# Patient Record
Sex: MALE | Race: WHITE | Employment: FULL TIME | URBAN - METROPOLITAN AREA
[De-identification: names, ages, dates, MRNs, and addresses within clinical notes are randomized per-mention and may not be internally consistent; named-entity substitution may affect disease eponyms.]

---

## 2017-01-22 ENCOUNTER — NURSE ONLY (OUTPATIENT)
Dept: FAMILY MEDICINE CLINIC | Facility: CLINIC | Age: 52
End: 2017-01-22

## 2017-01-22 VITALS
WEIGHT: 270 LBS | OXYGEN SATURATION: 96 % | TEMPERATURE: 99 F | HEIGHT: 70 IN | DIASTOLIC BLOOD PRESSURE: 86 MMHG | SYSTOLIC BLOOD PRESSURE: 124 MMHG | BODY MASS INDEX: 38.65 KG/M2 | RESPIRATION RATE: 20 BRPM | HEART RATE: 82 BPM

## 2017-01-22 DIAGNOSIS — J01.00 ACUTE MAXILLARY SINUSITIS, RECURRENCE NOT SPECIFIED: Primary | ICD-10-CM

## 2017-01-22 DIAGNOSIS — J01.10 ACUTE FRONTAL SINUSITIS, RECURRENCE NOT SPECIFIED: ICD-10-CM

## 2017-01-22 PROCEDURE — 99203 OFFICE O/P NEW LOW 30 MIN: CPT | Performed by: NURSE PRACTITIONER

## 2017-01-22 RX ORDER — DOXYCYCLINE HYCLATE 100 MG
100 TABLET ORAL 2 TIMES DAILY
Qty: 20 TABLET | Refills: 0 | Status: SHIPPED | OUTPATIENT
Start: 2017-01-22 | End: 2017-01-30

## 2017-01-22 NOTE — PROGRESS NOTES
CHIEF COMPLAINT:   Patient presents with:  Sinus Problem: maxillary and frontal sinus pressure x 3 days    HPI:   Jameel Hernandez is a 46year old male with hx of cold symptoms that have progressed to frontal and maxillary sinus congestion and pressure. SKIN: no rashes,no suspicious lesions  HEAD: atraumatic, normocephalic. EYES: conjunctiva clear, EOM intact  EARS: TM's are dull, non-bulging and non-injected, bilaterally  NOSE: No exudates, nasal mucosa is erythematous and swollen.    THROAT: Oral mucos · Heat may help soothe painful areas of the face. Use a towel soaked in hot water. Or,  the shower and direct the hot spray onto your face.  Using a vaporizer along with a menthol rub at night may also help.   · An expectorant containing guaifenesin · Vision problems, including blurred or double vision  · Fever of 100.4ºF (38ºC) or higher, or as directed by your healthcare provider  · Seizure  · Breathing problems  · Symptoms not resolving within 10 days  Date Last Reviewed: 4/13/2015  © 2509-4593 [employer]

## 2017-04-20 PROBLEM — M94.261 CHONDROMALACIA, RIGHT KNEE: Status: ACTIVE | Noted: 2017-04-20

## 2018-07-24 ENCOUNTER — HOSPITAL ENCOUNTER (OUTPATIENT)
Age: 53
Discharge: HOME | End: 2018-07-24
Attending: EMERGENCY MEDICINE
Payer: OTHER MISCELLANEOUS

## 2018-07-24 ENCOUNTER — APPOINTMENT (OUTPATIENT)
Dept: GENERAL RADIOLOGY | Age: 53
End: 2018-07-24
Attending: EMERGENCY MEDICINE
Payer: OTHER MISCELLANEOUS

## 2018-07-24 VITALS
BODY MASS INDEX: 38.65 KG/M2 | OXYGEN SATURATION: 98 % | DIASTOLIC BLOOD PRESSURE: 78 MMHG | SYSTOLIC BLOOD PRESSURE: 125 MMHG | RESPIRATION RATE: 18 BRPM | TEMPERATURE: 98 F | HEIGHT: 70 IN | WEIGHT: 270 LBS | HEART RATE: 76 BPM

## 2018-07-24 DIAGNOSIS — S86.911A KNEE STRAIN, RIGHT, INITIAL ENCOUNTER: Primary | ICD-10-CM

## 2018-07-24 PROCEDURE — 99203 OFFICE O/P NEW LOW 30 MIN: CPT

## 2018-07-24 PROCEDURE — 73560 X-RAY EXAM OF KNEE 1 OR 2: CPT | Performed by: EMERGENCY MEDICINE

## 2018-07-24 RX ORDER — HYDROCODONE BITARTRATE AND ACETAMINOPHEN 5; 325 MG/1; MG/1
1-2 TABLET ORAL EVERY 6 HOURS PRN
Qty: 8 TABLET | Refills: 0 | Status: SHIPPED | OUTPATIENT
Start: 2018-07-24 | End: 2019-03-13 | Stop reason: ALTCHOICE

## 2018-07-24 NOTE — ED INITIAL ASSESSMENT (HPI)
Twisted right knee while pulling 6-7 pallets. Felt sore as the day went on. The next day felt much worse, took OTC meds without relief.  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 + distal.

## 2018-07-24 NOTE — ED PROVIDER NOTES
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  He appears well-developed a without relief. 8 tablets of Norco were given along with no driving precautions. Work restrictions were given. All questions were answered prior to discharge.         Disposition and Plan     Clinical Impression:  Knee strain, right, initial encounter  (

## 2018-07-26 ENCOUNTER — HOSPITAL ENCOUNTER (OUTPATIENT)
Dept: ULTRASOUND IMAGING | Facility: HOSPITAL | Age: 53
Discharge: HOME | End: 2018-07-26
Attending: FAMILY MEDICINE
Payer: OTHER MISCELLANEOUS

## 2018-07-26 ENCOUNTER — OFFICE VISIT (OUTPATIENT)
Dept: OTHER | Facility: HOSPITAL | Age: 53
End: 2018-07-26
Attending: FAMILY MEDICINE
Payer: OTHER MISCELLANEOUS

## 2018-07-26 DIAGNOSIS — R60.9 SWELLING: Primary | ICD-10-CM

## 2018-07-26 DIAGNOSIS — R60.9 SWELLING: ICD-10-CM

## 2018-07-26 PROCEDURE — 93971 EXTREMITY STUDY: CPT | Performed by: FAMILY MEDICINE

## 2018-08-03 ENCOUNTER — OFFICE VISIT (OUTPATIENT)
Dept: PHYSICAL THERAPY | Age: 53
End: 2018-08-03
Payer: OTHER MISCELLANEOUS

## 2018-08-07 ENCOUNTER — OFFICE VISIT (OUTPATIENT)
Dept: PHYSICAL THERAPY | Age: 53
End: 2018-08-07
Attending: FAMILY MEDICINE
Payer: OTHER MISCELLANEOUS

## 2018-08-07 PROCEDURE — 97161 PT EVAL LOW COMPLEX 20 MIN: CPT

## 2018-08-07 PROCEDURE — 97110 THERAPEUTIC EXERCISES: CPT

## 2018-08-07 NOTE — PROGRESS NOTES
LOWER EXTREMITY EVALUATION:   Referring Physician: Dr. Irene Hart  Diagnosis: Right Kb     Date of Service: 8/7/2018     PATIENT SUMMARY   Carmen Snellen is a 45year old y/o male who presents to therapy today with complaints of right knee 4/5; L 4/5 Flexion: R 4/5; L 4/5  Extension: R 4/5; L 5/5    DF: R 5/5; L 5/5  PF: R 4/5; L 5/5  INV: R 5/5; L 5/5  EV: R 5/5; L 5/5     Special tests: Negative    Balance: SLS: R 30 sec, L 30 sec    Today’s Treatment and Response: Supine quad set R 10 x 5

## 2018-08-10 ENCOUNTER — OFFICE VISIT (OUTPATIENT)
Dept: PHYSICAL THERAPY | Age: 53
End: 2018-08-10
Attending: FAMILY MEDICINE
Payer: OTHER MISCELLANEOUS

## 2018-08-10 PROCEDURE — 97110 THERAPEUTIC EXERCISES: CPT

## 2018-08-10 NOTE — PROGRESS NOTES
Dx: R knee strain         Authorized # of Visits:  8         Next MD visit: none scheduled  Fall Risk: standard         Precautions: Prostate Cancer, HTN             Subjective: Pain, soreness in the right knee 2/10 today.  Working today from 12-8 pm.     Nico Castorena

## 2018-08-15 ENCOUNTER — OFFICE VISIT (OUTPATIENT)
Dept: PHYSICAL THERAPY | Age: 53
End: 2018-08-15
Attending: FAMILY MEDICINE
Payer: OTHER MISCELLANEOUS

## 2018-08-15 PROCEDURE — 97035 APP MDLTY 1+ULTRASOUND EA 15: CPT

## 2018-08-15 PROCEDURE — 97110 THERAPEUTIC EXERCISES: CPT

## 2018-08-15 NOTE — PROGRESS NOTES
Dx: R knee strain         Authorized # of Visits:  8         Next MD visit: none scheduled  Fall Risk: standard         Precautions: Prostate Cancer, HTN             Subjective: Pain, soreness in the right knee 3/10 today.  Achy this morning, worse after mo knee flexion with 3# wt R x 20 Patella mobilizations R 30 secs each direction        Prone quads stretch R 30 sec hold x 3 Prone hamstring curls with 3# wt R x 20  Prone quads stretch R 30 sec hold        Supine hamstring stretch R 30 sec hold x 3 Seated k

## 2018-08-17 ENCOUNTER — OFFICE VISIT (OUTPATIENT)
Dept: PHYSICAL THERAPY | Age: 53
End: 2018-08-17
Attending: FAMILY MEDICINE
Payer: OTHER MISCELLANEOUS

## 2018-08-17 PROCEDURE — 97140 MANUAL THERAPY 1/> REGIONS: CPT

## 2018-08-17 PROCEDURE — 97110 THERAPEUTIC EXERCISES: CPT

## 2018-08-17 NOTE — PROGRESS NOTES
Dx: R knee strain         Authorized # of Visits:  8         Next MD visit: none scheduled  Fall Risk: standard         Precautions: Prostate Cancer, HTN             Subjective: Pain, soreness in the right knee 3-4/10 today.  I am concerned that it is not g stretch R 30 sec hold  STM with biofreeze gel medial aspect R knee 5 mins       Hook lying bridging x 20 Hook lying bridging x 20 Supine SLR R x 20       Side lying clam R x 20  Hip abd R x 20 Side lying clam R x 20  Hip abd R x 20 X 20    X 20       Prone

## 2018-08-20 ENCOUNTER — APPOINTMENT (OUTPATIENT)
Dept: PHYSICAL THERAPY | Age: 53
End: 2018-08-20
Attending: FAMILY MEDICINE
Payer: OTHER MISCELLANEOUS

## 2018-08-20 ENCOUNTER — APPOINTMENT (OUTPATIENT)
Dept: OTHER | Facility: HOSPITAL | Age: 53
End: 2018-08-20
Attending: ORTHOPAEDIC SURGERY

## 2018-08-22 ENCOUNTER — APPOINTMENT (OUTPATIENT)
Dept: PHYSICAL THERAPY | Age: 53
End: 2018-08-22
Attending: FAMILY MEDICINE
Payer: OTHER MISCELLANEOUS

## 2018-08-24 ENCOUNTER — OFFICE VISIT (OUTPATIENT)
Dept: PHYSICAL THERAPY | Age: 53
End: 2018-08-24
Attending: FAMILY MEDICINE
Payer: OTHER MISCELLANEOUS

## 2018-08-24 PROCEDURE — 97110 THERAPEUTIC EXERCISES: CPT

## 2018-08-24 PROCEDURE — 97140 MANUAL THERAPY 1/> REGIONS: CPT

## 2018-08-24 NOTE — PROGRESS NOTES
Dx: R knee strain         Authorized # of Visits:  8         Next MD visit: none scheduled  Fall Risk: standard         Precautions: Prostate Cancer, HTN             Subjective: Pain, soreness in the right knee 1-2/10 today.  I been taking it easy at work t aspect R knee 5 mins Supine quad set over towel roll R 3 x 10  Heel slides R x 10    Hook lying bridging x 20 Hook lying bridging x 20 Supine SLR R x 20 Hook lying bridging 3 x 10    Side lying clam R x 20  Hip abd R x 20 Side lying clam R x 20  Hip abd R

## 2018-08-27 ENCOUNTER — OFFICE VISIT (OUTPATIENT)
Dept: PHYSICAL THERAPY | Age: 53
End: 2018-08-27
Attending: FAMILY MEDICINE
Payer: OTHER MISCELLANEOUS

## 2018-08-27 PROCEDURE — 97140 MANUAL THERAPY 1/> REGIONS: CPT

## 2018-08-27 PROCEDURE — 97110 THERAPEUTIC EXERCISES: CPT

## 2018-08-27 NOTE — PROGRESS NOTES
Dx: R knee strain         Authorized # of Visits:  6        Next MD visit: 8/28/18  Fall Risk: standard         Precautions: Prostate Cancer, HTN           Progress note:  Subjective: Pain, soreness in the right knee ranges from 1-6/10.  Today is a good day band R 3 x 10 Seated knee flexion with blue t band R 3 x 10   Hook lying hip abd with black t band x 20  Add squeeze with ball x 20 Supine SLR R x 20  Heel slides R x 10  Hamstring stretch R 30 sec hold  STM with biofreeze gel medial aspect R knee 5 mins S distraction 30 secs, post, ant glide 30 secs Hook lying R knee grade 3 joint mobilizations ant glide, post glide, distraction 30 secs each   Supine with CP R knee 10 mins  HEP review 5 mins Supine with towel roll, CP R knee 10 mins Supine patella mobilizat

## 2018-09-10 ENCOUNTER — APPOINTMENT (OUTPATIENT)
Dept: PHYSICAL THERAPY | Age: 53
End: 2018-09-10
Attending: FAMILY MEDICINE
Payer: OTHER MISCELLANEOUS

## 2019-03-27 PROCEDURE — 88305 TISSUE EXAM BY PATHOLOGIST: CPT | Performed by: INTERNAL MEDICINE

## 2021-03-30 PROCEDURE — 88304 TISSUE EXAM BY PATHOLOGIST: CPT | Performed by: PLASTIC SURGERY

## 2021-04-12 DIAGNOSIS — Z23 NEED FOR VACCINATION: ICD-10-CM

## 2023-01-01 ENCOUNTER — APPOINTMENT (OUTPATIENT)
Dept: CT IMAGING | Age: 58
DRG: 955 | End: 2023-01-01
Attending: NURSE PRACTITIONER

## 2023-01-01 ENCOUNTER — APPOINTMENT (OUTPATIENT)
Dept: GENERAL RADIOLOGY | Age: 58
DRG: 955 | End: 2023-01-01

## 2023-01-01 ENCOUNTER — APPOINTMENT (OUTPATIENT)
Dept: CT IMAGING | Age: 58
DRG: 955 | End: 2023-01-01
Attending: PHYSICIAN ASSISTANT

## 2023-01-01 ENCOUNTER — APPOINTMENT (OUTPATIENT)
Dept: GENERAL RADIOLOGY | Age: 58
DRG: 955 | End: 2023-01-01
Attending: NURSE PRACTITIONER

## 2023-01-01 ENCOUNTER — APPOINTMENT (OUTPATIENT)
Dept: GENERAL RADIOLOGY | Age: 58
DRG: 955 | End: 2023-01-01
Attending: SURGERY

## 2023-01-01 ENCOUNTER — APPOINTMENT (OUTPATIENT)
Dept: CT IMAGING | Age: 58
DRG: 955 | End: 2023-01-01

## 2023-01-01 ENCOUNTER — ANESTHESIA (OUTPATIENT)
Dept: SURGERY | Age: 58
DRG: 955 | End: 2023-01-01

## 2023-01-01 ENCOUNTER — HOSPITAL ENCOUNTER (INPATIENT)
Age: 58
LOS: 8 days | DRG: 955 | End: 2023-08-18

## 2023-01-01 ENCOUNTER — APPOINTMENT (OUTPATIENT)
Dept: MRI IMAGING | Age: 58
DRG: 955 | End: 2023-01-01

## 2023-01-01 ENCOUNTER — ANESTHESIA EVENT (OUTPATIENT)
Dept: SURGERY | Age: 58
DRG: 955 | End: 2023-01-01

## 2023-01-01 VITALS
RESPIRATION RATE: 16 BRPM | BODY MASS INDEX: 29.41 KG/M2 | OXYGEN SATURATION: 36 % | DIASTOLIC BLOOD PRESSURE: 51 MMHG | SYSTOLIC BLOOD PRESSURE: 81 MMHG | HEIGHT: 71 IN | WEIGHT: 210.1 LBS | TEMPERATURE: 99.1 F
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
## 2023-01-01 LAB
ABO + RH BLD: NORMAL
ABO + RH BLD: NORMAL
ALBUMIN SERPL-MCNC: 1.9 G/DL (ref 3.6–5.1)
ALBUMIN SERPL-MCNC: 2 G/DL (ref 3.6–5.1)
ALBUMIN SERPL-MCNC: 2.2 G/DL (ref 3.6–5.1)
ALBUMIN SERPL-MCNC: 3.6 G/DL (ref 3.6–5.1)
ALBUMIN/GLOB SERPL: 0.4 {RATIO} (ref 1–2.4)
ALBUMIN/GLOB SERPL: 0.5 {RATIO} (ref 1–2.4)
ALBUMIN/GLOB SERPL: 0.5 {RATIO} (ref 1–2.4)
ALBUMIN/GLOB SERPL: 1.1 {RATIO} (ref 1–2.4)
ALP SERPL-CCNC: 72 UNITS/L (ref 45–117)
ALP SERPL-CCNC: 79 UNITS/L (ref 45–117)
ALP SERPL-CCNC: 86 UNITS/L (ref 45–117)
ALP SERPL-CCNC: 88 UNITS/L (ref 45–117)
ALP SERPL-CCNC: 89 UNITS/L (ref 45–117)
ALP SERPL-CCNC: 89 UNITS/L (ref 45–117)
ALP SERPL-CCNC: 98 UNITS/L (ref 45–117)
ALT SERPL-CCNC: 100 UNITS/L
ALT SERPL-CCNC: 127 UNITS/L
ALT SERPL-CCNC: 150 UNITS/L
ALT SERPL-CCNC: 152 UNITS/L
ALT SERPL-CCNC: 158 UNITS/L
ALT SERPL-CCNC: 167 UNITS/L
ALT SERPL-CCNC: 89 UNITS/L
AMPHETAMINES UR QL SCN>500 NG/ML: NEGATIVE
AMYLASE SERPL-CCNC: 105 UNITS/L (ref 25–115)
AMYLASE SERPL-CCNC: 111 UNITS/L (ref 25–115)
AMYLASE SERPL-CCNC: 125 UNITS/L (ref 25–115)
AMYLASE SERPL-CCNC: 129 UNITS/L (ref 25–115)
AMYLASE SERPL-CCNC: 132 UNITS/L (ref 25–115)
AMYLASE SERPL-CCNC: 37 UNITS/L (ref 25–115)
AMYLASE SERPL-CCNC: 92 UNITS/L (ref 25–115)
ANION GAP SERPL CALC-SCNC: 10 MMOL/L (ref 7–19)
ANION GAP SERPL CALC-SCNC: 10 MMOL/L (ref 7–19)
ANION GAP SERPL CALC-SCNC: 11 MMOL/L (ref 7–19)
ANION GAP SERPL CALC-SCNC: 13 MMOL/L (ref 7–19)
ANION GAP SERPL CALC-SCNC: 15 MMOL/L (ref 7–19)
ANION GAP SERPL CALC-SCNC: 5 MMOL/L (ref 7–19)
ANION GAP SERPL CALC-SCNC: 7 MMOL/L (ref 7–19)
ANION GAP SERPL CALC-SCNC: 8 MMOL/L (ref 7–19)
ANION GAP SERPL CALC-SCNC: 9 MMOL/L (ref 7–19)
APPEARANCE UR: CLEAR
APTT PPP: 25 SEC (ref 22–30)
APTT PPP: 25 SEC (ref 22–30)
APTT PPP: 26 SEC (ref 22–30)
APTT PPP: 26 SEC (ref 22–30)
APTT PPP: 27 SEC (ref 22–30)
APTT PPP: 28 SEC (ref 22–30)
APTT PPP: 28 SEC (ref 22–30)
APTT PPP: 29 SEC (ref 22–30)
AST SERPL-CCNC: 102 UNITS/L
AST SERPL-CCNC: 45 UNITS/L
AST SERPL-CCNC: 72 UNITS/L
AST SERPL-CCNC: 80 UNITS/L
AST SERPL-CCNC: 87 UNITS/L
AST SERPL-CCNC: 93 UNITS/L
AST SERPL-CCNC: 95 UNITS/L
ATRIAL RATE (BPM): 125
BACTERIA #/AREA URNS HPF: ABNORMAL /HPF
BARBITURATES UR QL SCN>200 NG/ML: NEGATIVE
BASE EXCESS / DEFICIT, ARTERIAL - RESPIRATORY: -6 MMOL/L (ref -2–3)
BASE EXCESS / DEFICIT, ARTERIAL - RESPIRATORY: -6 MMOL/L (ref -2–3)
BASE EXCESS / DEFICIT, ARTERIAL - RESPIRATORY: -7 MMOL/L (ref -2–3)
BASE EXCESS / DEFICIT, ARTERIAL - RESPIRATORY: 2 MMOL/L (ref -2–3)
BASE EXCESS / DEFICIT, ARTERIAL - RESPIRATORY: 4 MMOL/L (ref -2–3)
BASE EXCESS / DEFICIT, ARTERIAL - RESPIRATORY: 5 MMOL/L (ref -2–3)
BASE EXCESS / DEFICIT, ARTERIAL - RESPIRATORY: 5 MMOL/L (ref -2–3)
BASE EXCESS / DEFICIT, ARTERIAL - RESPIRATORY: 6 MMOL/L (ref -2–3)
BASE EXCESS / DEFICIT, ARTERIAL - RESPIRATORY: 7 MMOL/L (ref -2–3)
BASE EXCESS / DEFICIT, ARTERIAL - RESPIRATORY: 8 MMOL/L (ref -2–3)
BASE EXCESS / DEFICIT, ARTERIAL - RESPIRATORY: 8 MMOL/L (ref -2–3)
BASE EXCESS / DEFICIT, ARTERIAL - RESPIRATORY: 9 MMOL/L (ref -2–3)
BASOPHILS # BLD: 0 K/MCL (ref 0–0.3)
BASOPHILS # BLD: 0.1 K/MCL (ref 0–0.3)
BASOPHILS NFR BLD: 0 %
BASOPHILS NFR BLD: 0 %
BASOPHILS NFR BLD: 1 %
BDY SITE: ABNORMAL
BENZODIAZ UR QL SCN>200 NG/ML: NEGATIVE
BILIRUB CONJ SERPL-MCNC: 0.2 MG/DL (ref 0–0.2)
BILIRUB CONJ SERPL-MCNC: 0.3 MG/DL (ref 0–0.2)
BILIRUB SERPL-MCNC: 0.4 MG/DL (ref 0.2–1)
BILIRUB SERPL-MCNC: 0.5 MG/DL (ref 0.2–1)
BILIRUB SERPL-MCNC: 0.6 MG/DL (ref 0.2–1)
BILIRUB SERPL-MCNC: 0.7 MG/DL (ref 0.2–1)
BILIRUB UR QL STRIP: NEGATIVE
BLD GP AB SCN SERPL QL GEL: NEGATIVE
BLD GP AB SCN SERPL QL GEL: NEGATIVE
BLOOD EXPIRATION DATE: NORMAL
BODY TEMPERATURE: 36.1 DEGREES
BODY TEMPERATURE: 36.5 DEGREES
BODY TEMPERATURE: 36.6 DEGREES
BODY TEMPERATURE: 36.9 DEGREES
BODY TEMPERATURE: 37 DEGREES
BODY TEMPERATURE: 37.1 DEGREES
BODY TEMPERATURE: 37.3 DEGREES
BODY TEMPERATURE: 37.4 DEGREES
BODY TEMPERATURE: 37.4 DEGREES
BODY TEMPERATURE: 37.6 DEGREES
BODY TEMPERATURE: 37.7 DEGREES
BODY TEMPERATURE: 37.8 DEGREES
BODY TEMPERATURE: 37.9 DEGREES
BODY TEMPERATURE: 38 DEGREES
BODY TEMPERATURE: 38.5 DEGREES
BUN SERPL-MCNC: 12 MG/DL (ref 6–20)
BUN SERPL-MCNC: 14 MG/DL (ref 6–20)
BUN SERPL-MCNC: 15 MG/DL (ref 6–20)
BUN SERPL-MCNC: 16 MG/DL (ref 6–20)
BUN SERPL-MCNC: 18 MG/DL (ref 6–20)
BUN SERPL-MCNC: 18 MG/DL (ref 6–20)
BUN SERPL-MCNC: 20 MG/DL (ref 6–20)
BUN SERPL-MCNC: 23 MG/DL (ref 6–20)
BUN SERPL-MCNC: 29 MG/DL (ref 6–20)
BUN SERPL-MCNC: 30 MG/DL (ref 6–20)
BUN SERPL-MCNC: 36 MG/DL (ref 6–20)
BUN SERPL-MCNC: 37 MG/DL (ref 6–20)
BUN SERPL-MCNC: 37 MG/DL (ref 6–20)
BUN SERPL-MCNC: 42 MG/DL (ref 6–20)
BUN/CREAT SERPL: 21 (ref 7–25)
BUN/CREAT SERPL: 21 (ref 7–25)
BUN/CREAT SERPL: 22 (ref 7–25)
BUN/CREAT SERPL: 30 (ref 7–25)
BUN/CREAT SERPL: 32 (ref 7–25)
BUN/CREAT SERPL: 35 (ref 7–25)
BUN/CREAT SERPL: 43 (ref 7–25)
BUN/CREAT SERPL: 52 (ref 7–25)
BUN/CREAT SERPL: 54 (ref 7–25)
BUN/CREAT SERPL: 61 (ref 7–25)
BUN/CREAT SERPL: 68 (ref 7–25)
BUN/CREAT SERPL: 70 (ref 7–25)
BUN/CREAT SERPL: 71 (ref 7–25)
BUN/CREAT SERPL: 77 (ref 7–25)
BZE UR QL SCN>150 NG/ML: NEGATIVE
CA-I BLD-SCNC: 1.13 MMOL/L (ref 1.15–1.29)
CALCIUM SERPL-MCNC: 7.4 MG/DL (ref 8.4–10.2)
CALCIUM SERPL-MCNC: 7.5 MG/DL (ref 8.4–10.2)
CALCIUM SERPL-MCNC: 7.9 MG/DL (ref 8.4–10.2)
CALCIUM SERPL-MCNC: 8.3 MG/DL (ref 8.4–10.2)
CALCIUM SERPL-MCNC: 8.3 MG/DL (ref 8.4–10.2)
CALCIUM SERPL-MCNC: 8.4 MG/DL (ref 8.4–10.2)
CALCIUM SERPL-MCNC: 8.4 MG/DL (ref 8.4–10.2)
CALCIUM SERPL-MCNC: 8.5 MG/DL (ref 8.4–10.2)
CALCIUM SERPL-MCNC: 8.6 MG/DL (ref 8.4–10.2)
CALCIUM SERPL-MCNC: 8.8 MG/DL (ref 8.4–10.2)
CANNABINOIDS UR QL SCN>50 NG/ML: NEGATIVE
CHLORIDE BLD-SCNC: 104 MMOL/L (ref 97–110)
CHLORIDE SERPL-SCNC: 108 MMOL/L (ref 97–110)
CHLORIDE SERPL-SCNC: 109 MMOL/L (ref 97–110)
CHLORIDE SERPL-SCNC: 110 MMOL/L (ref 97–110)
CHLORIDE SERPL-SCNC: 111 MMOL/L (ref 97–110)
CHLORIDE SERPL-SCNC: 111 MMOL/L (ref 97–110)
CHLORIDE SERPL-SCNC: 113 MMOL/L (ref 97–110)
CHLORIDE SERPL-SCNC: 113 MMOL/L (ref 97–110)
CHLORIDE SERPL-SCNC: 116 MMOL/L (ref 97–110)
CHLORIDE SERPL-SCNC: 117 MMOL/L (ref 97–110)
CHLORIDE SERPL-SCNC: 118 MMOL/L (ref 97–110)
CK SERPL-CCNC: 103 UNITS/L (ref 39–308)
CK SERPL-CCNC: 125 UNITS/L (ref 39–308)
CK SERPL-CCNC: 194 UNITS/L (ref 39–308)
CK SERPL-CCNC: 214 UNITS/L (ref 39–308)
CK SERPL-CCNC: 228 UNITS/L (ref 39–308)
CK SERPL-CCNC: 37 UNITS/L (ref 39–308)
CK SERPL-CCNC: 47 UNITS/L (ref 39–308)
CLOSURE TME BLD-IMP: NORMAL
CLOSURE TME COLL+ADP BLD: NORMAL S
CLOSURE TME COLL+EPINEP BLD: 122 SEC (ref 70–160)
CO2 SERPL-SCNC: 20 MMOL/L (ref 21–32)
CO2 SERPL-SCNC: 21 MMOL/L (ref 21–32)
CO2 SERPL-SCNC: 22 MMOL/L (ref 21–32)
CO2 SERPL-SCNC: 26 MMOL/L (ref 21–32)
CO2 SERPL-SCNC: 26 MMOL/L (ref 21–32)
CO2 SERPL-SCNC: 27 MMOL/L (ref 21–32)
CO2 SERPL-SCNC: 28 MMOL/L (ref 21–32)
CO2 SERPL-SCNC: 30 MMOL/L (ref 21–32)
CO2 SERPL-SCNC: 30 MMOL/L (ref 21–32)
CO2 SERPL-SCNC: 31 MMOL/L (ref 21–32)
CO2 SERPL-SCNC: 31 MMOL/L (ref 21–32)
COHGB MFR BLDV: 1.8 %
COHGB MFR BLDV: 2.2 %
COLOR UR: ABNORMAL
COLOR UR: COLORLESS
COLOR UR: YELLOW
CONDITION: ABNORMAL
CREAT SERPL-MCNC: 0.44 MG/DL (ref 0.67–1.17)
CREAT SERPL-MCNC: 0.47 MG/DL (ref 0.67–1.17)
CREAT SERPL-MCNC: 0.48 MG/DL (ref 0.67–1.17)
CREAT SERPL-MCNC: 0.49 MG/DL (ref 0.67–1.17)
CREAT SERPL-MCNC: 0.51 MG/DL (ref 0.67–1.17)
CREAT SERPL-MCNC: 0.53 MG/DL (ref 0.67–1.17)
CREAT SERPL-MCNC: 0.54 MG/DL (ref 0.67–1.17)
CREAT SERPL-MCNC: 0.56 MG/DL (ref 0.67–1.17)
CREAT SERPL-MCNC: 0.57 MG/DL (ref 0.67–1.17)
CREAT SERPL-MCNC: 0.59 MG/DL (ref 0.67–1.17)
CREAT SERPL-MCNC: 0.67 MG/DL (ref 0.67–1.17)
CREAT SERPL-MCNC: 0.67 MG/DL (ref 0.67–1.17)
CROSSMATCH RESULT: NORMAL
D DIMER PPP FEU-MCNC: 10.1 MG/L (FEU)
D DIMER PPP FEU-MCNC: 11.12 MG/L (FEU)
D DIMER PPP FEU-MCNC: 13.63 MG/L (FEU)
D DIMER PPP FEU-MCNC: 15.82 MG/L (FEU)
D DIMER PPP FEU-MCNC: 23.1 MG/L (FEU)
D DIMER PPP FEU-MCNC: 9.56 MG/L (FEU)
DEPRECATED RDW RBC: 42.8 FL (ref 39–50)
DEPRECATED RDW RBC: 43.3 FL (ref 39–50)
DEPRECATED RDW RBC: 44 FL (ref 39–50)
DEPRECATED RDW RBC: 44.4 FL (ref 39–50)
DEPRECATED RDW RBC: 45.6 FL (ref 39–50)
DEPRECATED RDW RBC: 45.7 FL (ref 39–50)
DEPRECATED RDW RBC: 45.7 FL (ref 39–50)
DEPRECATED RDW RBC: 46.1 FL (ref 39–50)
DEPRECATED RDW RBC: 46.2 FL (ref 39–50)
DEPRECATED RDW RBC: 46.4 FL (ref 39–50)
DEPRECATED RDW RBC: 46.5 FL (ref 39–50)
DEPRECATED RDW RBC: 46.6 FL (ref 39–50)
DEPRECATED RDW RBC: 46.8 FL (ref 39–50)
DEPRECATED RDW RBC: 47.8 FL (ref 39–50)
DISPENSE STATUS: NORMAL
EGFRCR SERPLBLD CKD-EPI 2021: >90 ML/MIN/{1.73_M2}
EOSINOPHIL # BLD: 0 K/MCL (ref 0–0.5)
EOSINOPHIL # BLD: 0.1 K/MCL (ref 0–0.5)
EOSINOPHIL # BLD: 0.2 K/MCL (ref 0–0.5)
EOSINOPHIL # BLD: 0.3 K/MCL (ref 0–0.5)
EOSINOPHIL NFR BLD: 0 %
EOSINOPHIL NFR BLD: 1 %
EOSINOPHIL NFR BLD: 2 %
EOSINOPHIL NFR BLD: 3 %
EOSINOPHIL NFR BLD: 3 %
EOSINOPHIL NFR BLD: 4 %
ERYTHROCYTE [DISTWIDTH] IN BLOOD: 12.2 % (ref 11–15)
ERYTHROCYTE [DISTWIDTH] IN BLOOD: 12.4 % (ref 11–15)
ERYTHROCYTE [DISTWIDTH] IN BLOOD: 12.5 % (ref 11–15)
ERYTHROCYTE [DISTWIDTH] IN BLOOD: 12.5 % (ref 11–15)
ERYTHROCYTE [DISTWIDTH] IN BLOOD: 12.6 % (ref 11–15)
ERYTHROCYTE [DISTWIDTH] IN BLOOD: 12.6 % (ref 11–15)
ERYTHROCYTE [DISTWIDTH] IN BLOOD: 12.8 % (ref 11–15)
ERYTHROCYTE [DISTWIDTH] IN BLOOD: 12.8 % (ref 11–15)
ERYTHROCYTE [DISTWIDTH] IN BLOOD: 12.9 % (ref 11–15)
ERYTHROCYTE [DISTWIDTH] IN BLOOD: 13 % (ref 11–15)
ERYTHROCYTE [DISTWIDTH] IN BLOOD: 13 % (ref 11–15)
ERYTHROCYTE [DISTWIDTH] IN BLOOD: 13.1 % (ref 11–15)
ETHANOL SERPL-MCNC: 136 MG/DL
FASTING DURATION TIME PATIENT: ABNORMAL H
FIBRINOGEN PPP-MCNC: >850 MG/DL (ref 190–425)
FIO2 ON VENT: 100 %
FIO2 ON VENT: 40 %
FIO2 ON VENT: 50 %
GFR SERPLBLD BASED ON 1.73 SQ M-ARVRAT: >90 ML/MIN
GLOBULIN SER-MCNC: 3.3 G/DL (ref 2–4)
GLOBULIN SER-MCNC: 4.2 G/DL (ref 2–4)
GLOBULIN SER-MCNC: 4.3 G/DL (ref 2–4)
GLOBULIN SER-MCNC: 4.5 G/DL (ref 2–4)
GLOBULIN SER-MCNC: 4.5 G/DL (ref 2–4)
GLOBULIN SER-MCNC: 4.6 G/DL (ref 2–4)
GLOBULIN SER-MCNC: 4.7 G/DL (ref 2–4)
GLUCOSE BLD-MCNC: 130 MG/DL (ref 65–99)
GLUCOSE BLDC GLUCOMTR-MCNC: 118 MG/DL (ref 70–99)
GLUCOSE BLDC GLUCOMTR-MCNC: 126 MG/DL (ref 70–99)
GLUCOSE BLDC GLUCOMTR-MCNC: 134 MG/DL (ref 70–99)
GLUCOSE BLDC GLUCOMTR-MCNC: 143 MG/DL (ref 70–99)
GLUCOSE BLDC GLUCOMTR-MCNC: 145 MG/DL (ref 70–99)
GLUCOSE BLDC GLUCOMTR-MCNC: 147 MG/DL (ref 70–99)
GLUCOSE BLDC GLUCOMTR-MCNC: 152 MG/DL (ref 70–99)
GLUCOSE BLDC GLUCOMTR-MCNC: 155 MG/DL (ref 70–99)
GLUCOSE BLDC GLUCOMTR-MCNC: 157 MG/DL (ref 70–99)
GLUCOSE BLDC GLUCOMTR-MCNC: 162 MG/DL (ref 70–99)
GLUCOSE BLDC GLUCOMTR-MCNC: 164 MG/DL (ref 70–99)
GLUCOSE BLDC GLUCOMTR-MCNC: 165 MG/DL (ref 70–99)
GLUCOSE BLDC GLUCOMTR-MCNC: 167 MG/DL (ref 70–99)
GLUCOSE BLDC GLUCOMTR-MCNC: 172 MG/DL (ref 70–99)
GLUCOSE BLDC GLUCOMTR-MCNC: 173 MG/DL (ref 70–99)
GLUCOSE BLDC GLUCOMTR-MCNC: 174 MG/DL (ref 70–99)
GLUCOSE BLDC GLUCOMTR-MCNC: 177 MG/DL (ref 70–99)
GLUCOSE BLDC GLUCOMTR-MCNC: 190 MG/DL (ref 70–99)
GLUCOSE SERPL-MCNC: 114 MG/DL (ref 70–99)
GLUCOSE SERPL-MCNC: 120 MG/DL (ref 70–99)
GLUCOSE SERPL-MCNC: 126 MG/DL (ref 70–99)
GLUCOSE SERPL-MCNC: 150 MG/DL (ref 70–99)
GLUCOSE SERPL-MCNC: 153 MG/DL (ref 70–99)
GLUCOSE SERPL-MCNC: 153 MG/DL (ref 70–99)
GLUCOSE SERPL-MCNC: 165 MG/DL (ref 70–99)
GLUCOSE SERPL-MCNC: 167 MG/DL (ref 70–99)
GLUCOSE SERPL-MCNC: 168 MG/DL (ref 70–99)
GLUCOSE SERPL-MCNC: 178 MG/DL (ref 70–99)
GLUCOSE SERPL-MCNC: 186 MG/DL (ref 70–99)
GLUCOSE SERPL-MCNC: 190 MG/DL (ref 70–99)
GLUCOSE SERPL-MCNC: 193 MG/DL (ref 70–99)
GLUCOSE SERPL-MCNC: 195 MG/DL (ref 70–99)
GLUCOSE UR STRIP-MCNC: NEGATIVE MG/DL
HCO3 BLDA-SCNC: 19 MMOL/L (ref 22–28)
HCO3 BLDA-SCNC: 21 MMOL/L (ref 22–28)
HCO3 BLDA-SCNC: 21 MMOL/L (ref 22–28)
HCO3 BLDA-SCNC: 25 MMOL/L (ref 22–28)
HCO3 BLDA-SCNC: 27 MMOL/L (ref 22–28)
HCO3 BLDA-SCNC: 27 MMOL/L (ref 22–28)
HCO3 BLDA-SCNC: 28 MMOL/L (ref 22–28)
HCO3 BLDA-SCNC: 29 MMOL/L (ref 22–28)
HCO3 BLDA-SCNC: 31 MMOL/L (ref 22–28)
HCO3 BLDA-SCNC: 32 MMOL/L (ref 22–28)
HCO3 BLDA-SCNC: 33 MMOL/L (ref 22–28)
HCO3 BLDA-SCNC: 34 MMOL/L (ref 22–28)
HCT VFR BLD CALC: 22.1 % (ref 39–51)
HCT VFR BLD CALC: 24.7 % (ref 39–51)
HCT VFR BLD CALC: 25.3 % (ref 39–51)
HCT VFR BLD CALC: 26.8 % (ref 39–51)
HCT VFR BLD CALC: 27.9 % (ref 39–51)
HCT VFR BLD CALC: 28.1 % (ref 39–51)
HCT VFR BLD CALC: 28.2 % (ref 39–51)
HCT VFR BLD CALC: 28.2 % (ref 39–51)
HCT VFR BLD CALC: 28.7 % (ref 39–51)
HCT VFR BLD CALC: 28.9 % (ref 39–51)
HCT VFR BLD CALC: 30.8 % (ref 39–51)
HCT VFR BLD CALC: 32.3 % (ref 39–51)
HCT VFR BLD CALC: 35.9 % (ref 39–51)
HCT VFR BLD CALC: 38 % (ref 39–51)
HCT VFR BLD CALC: 41.3 % (ref 39–51)
HCT VFR BLD CALC: 43 % (ref 39–51)
HGB BLD-MCNC: 10.5 G/DL (ref 13–17)
HGB BLD-MCNC: 11.8 G/DL (ref 13–17)
HGB BLD-MCNC: 12.8 G/DL (ref 13–17)
HGB BLD-MCNC: 13.7 G/DL (ref 13–17)
HGB BLD-MCNC: 14.4 G/DL (ref 13–17)
HGB BLD-MCNC: 7.1 G/DL (ref 13–17)
HGB BLD-MCNC: 8.1 G/DL (ref 13–17)
HGB BLD-MCNC: 8.2 G/DL (ref 13–17)
HGB BLD-MCNC: 8.5 G/DL (ref 13–17)
HGB BLD-MCNC: 8.7 G/DL (ref 13–17)
HGB BLD-MCNC: 8.8 G/DL (ref 13–17)
HGB BLD-MCNC: 8.9 G/DL (ref 13–17)
HGB BLD-MCNC: 9 G/DL (ref 13–17)
HGB BLD-MCNC: 9 G/DL (ref 13–17)
HGB BLD-MCNC: 9.3 G/DL (ref 13–17)
HGB BLD-MCNC: 9.8 G/DL (ref 13–17)
HGB UR QL STRIP: ABNORMAL
HGB UR QL STRIP: NEGATIVE
HOROWITZ INDEX BLDA+IHG-RTO: 157 MM[HG] (ref 300–500)
HOROWITZ INDEX BLDA+IHG-RTO: 215 MM[HG] (ref 300–500)
HOROWITZ INDEX BLDA+IHG-RTO: 225 MM[HG] (ref 300–500)
HOROWITZ INDEX BLDA+IHG-RTO: 226 MM[HG] (ref 300–500)
HOROWITZ INDEX BLDA+IHG-RTO: 228 MM[HG] (ref 300–500)
HOROWITZ INDEX BLDA+IHG-RTO: 230 MM[HG] (ref 300–500)
HOROWITZ INDEX BLDA+IHG-RTO: 233 MM[HG] (ref 300–500)
HOROWITZ INDEX BLDA+IHG-RTO: 243 MM[HG] (ref 300–500)
HOROWITZ INDEX BLDA+IHG-RTO: 248 MM[HG] (ref 300–500)
HOROWITZ INDEX BLDA+IHG-RTO: 253 MM[HG] (ref 300–500)
HOROWITZ INDEX BLDA+IHG-RTO: 265 MM[HG] (ref 300–500)
HOROWITZ INDEX BLDA+IHG-RTO: 273 MM[HG] (ref 300–500)
HOROWITZ INDEX BLDA+IHG-RTO: 280 MM[HG] (ref 300–500)
HOROWITZ INDEX BLDA+IHG-RTO: 281 MM[HG] (ref 300–500)
HOROWITZ INDEX BLDA+IHG-RTO: 285 MM[HG] (ref 300–500)
HOROWITZ INDEX BLDA+IHG-RTO: 73 MM[HG] (ref 300–500)
HOROWITZ INDEX BLDA+IHG-RTO: 75 MM[HG] (ref 300–500)
HYALINE CASTS #/AREA URNS LPF: ABNORMAL /LPF
IMM GRANULOCYTES # BLD AUTO: 0 K/MCL (ref 0–0.2)
IMM GRANULOCYTES # BLD AUTO: 0.1 K/MCL (ref 0–0.2)
IMM GRANULOCYTES # BLD: 0 %
IMM GRANULOCYTES # BLD: 1 %
INR PPP: 1
ISBT BLOOD TYPE: 6200
ISSUE DATE/TIME: NORMAL
KETONES UR STRIP-MCNC: NEGATIVE MG/DL
LDH SERPL L TO P-CCNC: 196 UNITS/L (ref 86–234)
LDH SERPL L TO P-CCNC: 216 UNITS/L (ref 86–234)
LDH SERPL L TO P-CCNC: 236 UNITS/L (ref 86–234)
LDH SERPL L TO P-CCNC: 240 UNITS/L (ref 86–234)
LDH SERPL L TO P-CCNC: 244 UNITS/L (ref 86–234)
LDH SERPL L TO P-CCNC: 244 UNITS/L (ref 86–234)
LEUKOCYTE ESTERASE UR QL STRIP: NEGATIVE
LIPASE SERPL-CCNC: 228 UNITS/L (ref 15–77)
LIPASE SERPL-CCNC: 257 UNITS/L (ref 15–77)
LIPASE SERPL-CCNC: 346 UNITS/L (ref 15–77)
LIPASE SERPL-CCNC: 347 UNITS/L (ref 15–77)
LIPASE SERPL-CCNC: 354 UNITS/L (ref 15–77)
LIPASE SERPL-CCNC: 356 UNITS/L (ref 15–77)
LYMPHOCYTES # BLD: 0.8 K/MCL (ref 1–4)
LYMPHOCYTES # BLD: 1.1 K/MCL (ref 1–4)
LYMPHOCYTES # BLD: 1.2 K/MCL (ref 1–4)
LYMPHOCYTES # BLD: 1.2 K/MCL (ref 1–4)
LYMPHOCYTES # BLD: 1.3 K/MCL (ref 1–4)
LYMPHOCYTES # BLD: 1.3 K/MCL (ref 1–4)
LYMPHOCYTES # BLD: 1.4 K/MCL (ref 1–4)
LYMPHOCYTES # BLD: 1.4 K/MCL (ref 1–4)
LYMPHOCYTES # BLD: 1.5 K/MCL (ref 1–4)
LYMPHOCYTES # BLD: 1.5 K/MCL (ref 1–4)
LYMPHOCYTES # BLD: 1.7 K/MCL (ref 1–4)
LYMPHOCYTES # BLD: 1.7 K/MCL (ref 1–4)
LYMPHOCYTES # BLD: 1.8 K/MCL (ref 1–4)
LYMPHOCYTES # BLD: 3.3 K/MCL (ref 1–4)
LYMPHOCYTES NFR BLD: 13 %
LYMPHOCYTES NFR BLD: 13 %
LYMPHOCYTES NFR BLD: 14 %
LYMPHOCYTES NFR BLD: 15 %
LYMPHOCYTES NFR BLD: 15 %
LYMPHOCYTES NFR BLD: 16 %
LYMPHOCYTES NFR BLD: 18 %
LYMPHOCYTES NFR BLD: 18 %
LYMPHOCYTES NFR BLD: 21 %
LYMPHOCYTES NFR BLD: 31 %
LYMPHOCYTES NFR BLD: 8 %
LYMPHOCYTES NFR BLD: 9 %
MAGNESIUM SERPL-MCNC: 1.8 MG/DL (ref 1.7–2.4)
MAGNESIUM SERPL-MCNC: 2 MG/DL (ref 1.7–2.4)
MAGNESIUM SERPL-MCNC: 2.2 MG/DL (ref 1.7–2.4)
MAGNESIUM SERPL-MCNC: 2.3 MG/DL (ref 1.7–2.4)
MAGNESIUM SERPL-MCNC: 2.5 MG/DL (ref 1.7–2.4)
MAGNESIUM SERPL-MCNC: 2.6 MG/DL (ref 1.7–2.4)
MAGNESIUM SERPL-MCNC: 2.6 MG/DL (ref 1.7–2.4)
MAGNESIUM SERPL-MCNC: 2.7 MG/DL (ref 1.7–2.4)
MCH RBC QN AUTO: 31 PG (ref 26–34)
MCH RBC QN AUTO: 31.1 PG (ref 26–34)
MCH RBC QN AUTO: 31.1 PG (ref 26–34)
MCH RBC QN AUTO: 31.3 PG (ref 26–34)
MCH RBC QN AUTO: 31.3 PG (ref 26–34)
MCH RBC QN AUTO: 31.4 PG (ref 26–34)
MCH RBC QN AUTO: 31.4 PG (ref 26–34)
MCH RBC QN AUTO: 31.5 PG (ref 26–34)
MCH RBC QN AUTO: 31.5 PG (ref 26–34)
MCH RBC QN AUTO: 31.6 PG (ref 26–34)
MCH RBC QN AUTO: 31.7 PG (ref 26–34)
MCH RBC QN AUTO: 31.7 PG (ref 26–34)
MCH RBC QN AUTO: 32 PG (ref 26–34)
MCH RBC QN AUTO: 32.3 PG (ref 26–34)
MCHC RBC AUTO-ENTMCNC: 31 G/DL (ref 32–36.5)
MCHC RBC AUTO-ENTMCNC: 31.2 G/DL (ref 32–36.5)
MCHC RBC AUTO-ENTMCNC: 31.4 G/DL (ref 32–36.5)
MCHC RBC AUTO-ENTMCNC: 31.7 G/DL (ref 32–36.5)
MCHC RBC AUTO-ENTMCNC: 31.8 G/DL (ref 32–36.5)
MCHC RBC AUTO-ENTMCNC: 31.9 G/DL (ref 32–36.5)
MCHC RBC AUTO-ENTMCNC: 31.9 G/DL (ref 32–36.5)
MCHC RBC AUTO-ENTMCNC: 32 G/DL (ref 32–36.5)
MCHC RBC AUTO-ENTMCNC: 32.1 G/DL (ref 32–36.5)
MCHC RBC AUTO-ENTMCNC: 32.2 G/DL (ref 32–36.5)
MCHC RBC AUTO-ENTMCNC: 32.5 G/DL (ref 32–36.5)
MCHC RBC AUTO-ENTMCNC: 32.9 G/DL (ref 32–36.5)
MCHC RBC AUTO-ENTMCNC: 33.2 G/DL (ref 32–36.5)
MCHC RBC AUTO-ENTMCNC: 33.2 G/DL (ref 32–36.5)
MCV RBC AUTO: 100 FL (ref 78–100)
MCV RBC AUTO: 100.4 FL (ref 78–100)
MCV RBC AUTO: 100.8 FL (ref 78–100)
MCV RBC AUTO: 95.2 FL (ref 78–100)
MCV RBC AUTO: 95.7 FL (ref 78–100)
MCV RBC AUTO: 97.2 FL (ref 78–100)
MCV RBC AUTO: 97.6 FL (ref 78–100)
MCV RBC AUTO: 97.8 FL (ref 78–100)
MCV RBC AUTO: 97.8 FL (ref 78–100)
MCV RBC AUTO: 98.2 FL (ref 78–100)
MCV RBC AUTO: 98.3 FL (ref 78–100)
MCV RBC AUTO: 98.4 FL (ref 78–100)
MCV RBC AUTO: 98.6 FL (ref 78–100)
MCV RBC AUTO: 99.3 FL (ref 78–100)
METHGB MFR BLDMV: 0.5 %
METHGB MFR BLDMV: 0.6 %
MONOCYTES # BLD: 0.7 K/MCL (ref 0.3–0.9)
MONOCYTES # BLD: 0.7 K/MCL (ref 0.3–0.9)
MONOCYTES # BLD: 0.8 K/MCL (ref 0.3–0.9)
MONOCYTES # BLD: 1 K/MCL (ref 0.3–0.9)
MONOCYTES # BLD: 1.2 K/MCL (ref 0.3–0.9)
MONOCYTES # BLD: 1.3 K/MCL (ref 0.3–0.9)
MONOCYTES # BLD: 1.4 K/MCL (ref 0.3–0.9)
MONOCYTES # BLD: 1.5 K/MCL (ref 0.3–0.9)
MONOCYTES # BLD: 1.9 K/MCL (ref 0.3–0.9)
MONOCYTES NFR BLD: 10 %
MONOCYTES NFR BLD: 10 %
MONOCYTES NFR BLD: 11 %
MONOCYTES NFR BLD: 13 %
MONOCYTES NFR BLD: 14 %
MONOCYTES NFR BLD: 15 %
MONOCYTES NFR BLD: 15 %
MONOCYTES NFR BLD: 8 %
MUCOUS THREADS URNS QL MICRO: PRESENT
NEUTROPHILS # BLD: 13.3 K/MCL (ref 1.8–7.7)
NEUTROPHILS # BLD: 5.3 K/MCL (ref 1.8–7.7)
NEUTROPHILS # BLD: 5.4 K/MCL (ref 1.8–7.7)
NEUTROPHILS # BLD: 5.5 K/MCL (ref 1.8–7.7)
NEUTROPHILS # BLD: 5.6 K/MCL (ref 1.8–7.7)
NEUTROPHILS # BLD: 6 K/MCL (ref 1.8–7.7)
NEUTROPHILS # BLD: 6 K/MCL (ref 1.8–7.7)
NEUTROPHILS # BLD: 6.1 K/MCL (ref 1.8–7.7)
NEUTROPHILS # BLD: 6.8 K/MCL (ref 1.8–7.7)
NEUTROPHILS # BLD: 6.9 K/MCL (ref 1.8–7.7)
NEUTROPHILS # BLD: 7.2 K/MCL (ref 1.8–7.7)
NEUTROPHILS # BLD: 7.3 K/MCL (ref 1.8–7.7)
NEUTROPHILS # BLD: 7.4 K/MCL (ref 1.8–7.7)
NEUTROPHILS # BLD: 7.9 K/MCL (ref 1.8–7.7)
NEUTROPHILS NFR BLD: 58 %
NEUTROPHILS NFR BLD: 59 %
NEUTROPHILS NFR BLD: 61 %
NEUTROPHILS NFR BLD: 63 %
NEUTROPHILS NFR BLD: 68 %
NEUTROPHILS NFR BLD: 69 %
NEUTROPHILS NFR BLD: 69 %
NEUTROPHILS NFR BLD: 70 %
NEUTROPHILS NFR BLD: 71 %
NEUTROPHILS NFR BLD: 72 %
NEUTROPHILS NFR BLD: 72 %
NEUTROPHILS NFR BLD: 75 %
NEUTROPHILS NFR BLD: 78 %
NEUTROPHILS NFR BLD: 80 %
NITRITE UR QL STRIP: NEGATIVE
NRBC BLD MANUAL-RTO: 0 /100 WBC
OPIATES UR QL SCN>300 NG/ML: NEGATIVE
OSMOLALITY SERPL: 295 MOSM/KG (ref 275–300)
OSMOLALITY SERPL: 297 MOSM/KG (ref 275–300)
OSMOLALITY SERPL: 298 MOSM/KG (ref 275–300)
OSMOLALITY SERPL: 301 MOSM/KG (ref 275–300)
OSMOLALITY SERPL: 301 MOSM/KG (ref 275–300)
OSMOLALITY SERPL: 303 MOSM/KG (ref 275–300)
OSMOLALITY SERPL: 307 MOSM/KG (ref 275–300)
OXYHGB MFR BLDA: 92.8 % (ref 94–98)
OXYHGB MFR BLDA: 97.2 % (ref 94–98)
P AXIS (DEGREES): 66
PCO2 BLDA: 32 MM HG (ref 35–48)
PCO2 BLDA: 35 MM HG (ref 35–48)
PCO2 BLDA: 36 MM HG (ref 35–48)
PCO2 BLDA: 37 MM HG (ref 35–48)
PCO2 BLDA: 39 MM HG (ref 35–48)
PCO2 BLDA: 41 MM HG (ref 35–48)
PCO2 BLDA: 43 MM HG (ref 35–48)
PCO2 BLDA: 44 MM HG (ref 35–48)
PCO2 BLDA: 45 MM HG (ref 35–48)
PCO2 BLDA: 46 MM HG (ref 35–48)
PCO2 BLDA: 46 MM HG (ref 35–48)
PCO2 BLDA: 47 MM HG (ref 35–48)
PCO2 BLDA: 48 MM HG (ref 35–48)
PCP UR QL SCN>25 NG/ML: NEGATIVE
PH BLDA: 7.24 UNITS (ref 7.35–7.45)
PH BLDA: 7.28 UNITS (ref 7.35–7.45)
PH BLDA: 7.34 UNITS (ref 7.35–7.45)
PH BLDA: 7.44 UNITS (ref 7.35–7.45)
PH BLDA: 7.44 UNITS (ref 7.35–7.45)
PH BLDA: 7.45 UNITS (ref 7.35–7.45)
PH BLDA: 7.46 UNITS (ref 7.35–7.45)
PH BLDA: 7.46 UNITS (ref 7.35–7.45)
PH BLDA: 7.47 UNITS (ref 7.35–7.45)
PH BLDA: 7.48 UNITS (ref 7.35–7.45)
PH BLDA: 7.48 UNITS (ref 7.35–7.45)
PH BLDA: 7.49 UNITS (ref 7.35–7.45)
PH BLDA: 7.49 UNITS (ref 7.35–7.45)
PH BLDA: 7.5 UNITS (ref 7.35–7.45)
PH BLDA: 7.5 UNITS (ref 7.35–7.45)
PH UR STRIP: 5 [PH] (ref 5–7)
PH UR STRIP: 5.5 [PH] (ref 5–7)
PH UR STRIP: 7 [PH] (ref 5–7)
PHOSPHATE SERPL-MCNC: 1.1 MG/DL (ref 2.4–4.7)
PHOSPHATE SERPL-MCNC: 1.8 MG/DL (ref 2.4–4.7)
PHOSPHATE SERPL-MCNC: 2.4 MG/DL (ref 2.4–4.7)
PHOSPHATE SERPL-MCNC: 2.4 MG/DL (ref 2.4–4.7)
PHOSPHATE SERPL-MCNC: 2.6 MG/DL (ref 2.4–4.7)
PHOSPHATE SERPL-MCNC: 3.1 MG/DL (ref 2.4–4.7)
PHOSPHATE SERPL-MCNC: 3.4 MG/DL (ref 2.4–4.7)
PHOSPHATE SERPL-MCNC: 3.7 MG/DL (ref 2.4–4.7)
PHOSPHATE SERPL-MCNC: 3.8 MG/DL (ref 2.4–4.7)
PHOSPHATE SERPL-MCNC: 3.8 MG/DL (ref 2.4–4.7)
PHOSPHATE SERPL-MCNC: 4 MG/DL (ref 2.4–4.7)
PHOSPHATE SERPL-MCNC: 4.2 MG/DL (ref 2.4–4.7)
PLATELET # BLD AUTO: 119 K/MCL (ref 140–450)
PLATELET # BLD AUTO: 126 K/MCL (ref 140–450)
PLATELET # BLD AUTO: 171 K/MCL (ref 140–450)
PLATELET # BLD AUTO: 177 K/MCL (ref 140–450)
PLATELET # BLD AUTO: 213 K/MCL (ref 140–450)
PLATELET # BLD AUTO: 213 K/MCL (ref 140–450)
PLATELET # BLD AUTO: 232 K/MCL (ref 140–450)
PLATELET # BLD AUTO: 247 K/MCL (ref 140–450)
PLATELET # BLD AUTO: 289 K/MCL (ref 140–450)
PLATELET # BLD AUTO: 310 K/MCL (ref 140–450)
PLATELET # BLD AUTO: 318 K/MCL (ref 140–450)
PLATELET # BLD AUTO: 344 K/MCL (ref 140–450)
PLATELET # BLD AUTO: 351 K/MCL (ref 140–450)
PLATELET # BLD AUTO: 366 K/MCL (ref 140–450)
PO2 BLDA: 107 MM HG (ref 83–108)
PO2 BLDA: 114 MM HG (ref 83–108)
PO2 BLDA: 124 MM HG (ref 83–108)
PO2 BLDA: 127 MM HG (ref 83–108)
PO2 BLDA: 157 MM HG (ref 83–108)
PO2 BLDA: 226 MM HG (ref 83–108)
PO2 BLDA: 268 MM HG (ref 83–108)
PO2 BLDA: 278 MM HG (ref 83–108)
PO2 BLDA: 281 MM HG (ref 83–108)
PO2 BLDA: 69 MM HG (ref 83–108)
PO2 BLDA: 77 MM HG (ref 83–108)
PO2 BLDA: 88 MM HG (ref 83–108)
PO2 BLDA: 91 MM HG (ref 83–108)
PO2 BLDA: 92 MM HG (ref 83–108)
PO2 BLDA: 94 MM HG (ref 83–108)
PO2 BLDA: 95 MM HG (ref 83–108)
PO2 BLDA: 97 MM HG (ref 83–108)
POTASSIUM BLD-SCNC: 3.1 MMOL/L (ref 3.4–5.1)
POTASSIUM SERPL-SCNC: 3.1 MMOL/L (ref 3.4–5.1)
POTASSIUM SERPL-SCNC: 3.5 MMOL/L (ref 3.4–5.1)
POTASSIUM SERPL-SCNC: 3.5 MMOL/L (ref 3.4–5.1)
POTASSIUM SERPL-SCNC: 3.7 MMOL/L (ref 3.4–5.1)
POTASSIUM SERPL-SCNC: 4 MMOL/L (ref 3.4–5.1)
POTASSIUM SERPL-SCNC: 4.1 MMOL/L (ref 3.4–5.1)
POTASSIUM SERPL-SCNC: 4.1 MMOL/L (ref 3.4–5.1)
POTASSIUM SERPL-SCNC: 4.4 MMOL/L (ref 3.4–5.1)
POTASSIUM SERPL-SCNC: 4.4 MMOL/L (ref 3.4–5.1)
POTASSIUM SERPL-SCNC: 4.5 MMOL/L (ref 3.4–5.1)
POTASSIUM SERPL-SCNC: 4.5 MMOL/L (ref 3.4–5.1)
POTASSIUM SERPL-SCNC: 4.6 MMOL/L (ref 3.4–5.1)
POTASSIUM SERPL-SCNC: 4.8 MMOL/L (ref 3.4–5.1)
POTASSIUM SERPL-SCNC: 4.8 MMOL/L (ref 3.4–5.1)
POTASSIUM SERPL-SCNC: 5.1 MMOL/L (ref 3.4–5.1)
PR-INTERVAL (MSEC): 118
PRODUCT CODE: NORMAL
PRODUCT DESCRIPTION: NORMAL
PRODUCT ID: NORMAL
PROT SERPL-MCNC: 6.2 G/DL (ref 6.4–8.2)
PROT SERPL-MCNC: 6.2 G/DL (ref 6.4–8.2)
PROT SERPL-MCNC: 6.5 G/DL (ref 6.4–8.2)
PROT SERPL-MCNC: 6.5 G/DL (ref 6.4–8.2)
PROT SERPL-MCNC: 6.6 G/DL (ref 6.4–8.2)
PROT SERPL-MCNC: 6.9 G/DL (ref 6.4–8.2)
PROT SERPL-MCNC: 6.9 G/DL (ref 6.4–8.2)
PROT UR STRIP-MCNC: 30 MG/DL
PROT UR STRIP-MCNC: ABNORMAL MG/DL
PROT UR STRIP-MCNC: NEGATIVE MG/DL
PROTHROMBIN TIME: 10 SEC (ref 9.7–11.8)
PROTHROMBIN TIME: 10.1 SEC (ref 9.7–11.8)
PROTHROMBIN TIME: 10.1 SEC (ref 9.7–11.8)
PROTHROMBIN TIME: 10.3 SEC (ref 9.7–11.8)
PROTHROMBIN TIME: 10.3 SEC (ref 9.7–11.8)
PROTHROMBIN TIME: 9.7 SEC (ref 9.7–11.8)
PROTHROMBIN TIME: 9.8 SEC (ref 9.7–11.8)
PROTHROMBIN TIME: 9.9 SEC (ref 9.7–11.8)
QRS-INTERVAL (MSEC): 88
QT-INTERVAL (MSEC): 320
QTC: 461
R AXIS (DEGREES): 59
RAINBOW EXTRA TUBES HOLD SPECIMEN: NORMAL
RAINBOW EXTRA TUBES HOLD SPECIMEN: NORMAL
RBC # BLD: 2.26 MIL/MCL (ref 4.5–5.9)
RBC # BLD: 2.54 MIL/MCL (ref 4.5–5.9)
RBC # BLD: 2.57 MIL/MCL (ref 4.5–5.9)
RBC # BLD: 2.66 MIL/MCL (ref 4.5–5.9)
RBC # BLD: 2.81 MIL/MCL (ref 4.5–5.9)
RBC # BLD: 2.81 MIL/MCL (ref 4.5–5.9)
RBC # BLD: 2.84 MIL/MCL (ref 4.5–5.9)
RBC # BLD: 2.87 MIL/MCL (ref 4.5–5.9)
RBC # BLD: 2.89 MIL/MCL (ref 4.5–5.9)
RBC # BLD: 2.93 MIL/MCL (ref 4.5–5.9)
RBC # BLD: 3.15 MIL/MCL (ref 4.5–5.9)
RBC # BLD: 3.31 MIL/MCL (ref 4.5–5.9)
RBC # BLD: 3.75 MIL/MCL (ref 4.5–5.9)
RBC # BLD: 4.34 MIL/MCL (ref 4.5–5.9)
RBC #/AREA URNS HPF: >100 /HPF
RBC #/AREA URNS HPF: ABNORMAL /HPF
REPORT TEXT: NORMAL
SAO2 % BLDA: 100 % (ref 95–99)
SAO2 % BLDA: 17 % (ref 15–23)
SAO2 % BLDA: 20 % (ref 15–23)
SAO2 % BLDA: 95 % (ref 95–99)
SAO2 % BLDA: 96 % (ref 95–99)
SAO2 % BLDA: 97 % (ref 95–99)
SAO2 % BLDA: 97 % (ref 95–99)
SAO2 % BLDA: 98 % (ref 95–99)
SAO2 % BLDA: 99 % (ref 95–99)
SODIUM BLD-SCNC: 136 MMOL/L (ref 135–145)
SODIUM SERPL-SCNC: 140 MMOL/L (ref 135–145)
SODIUM SERPL-SCNC: 142 MMOL/L (ref 135–145)
SODIUM SERPL-SCNC: 142 MMOL/L (ref 135–145)
SODIUM SERPL-SCNC: 143 MMOL/L (ref 135–145)
SODIUM SERPL-SCNC: 143 MMOL/L (ref 135–145)
SODIUM SERPL-SCNC: 144 MMOL/L (ref 135–145)
SODIUM SERPL-SCNC: 144 MMOL/L (ref 135–145)
SODIUM SERPL-SCNC: 145 MMOL/L (ref 135–145)
SODIUM SERPL-SCNC: 147 MMOL/L (ref 135–145)
SODIUM SERPL-SCNC: 148 MMOL/L (ref 135–145)
SODIUM SERPL-SCNC: 149 MMOL/L (ref 135–145)
SP GR UR STRIP: 1.02 (ref 1–1.03)
SP GR UR STRIP: 1.03 (ref 1–1.03)
SP GR UR STRIP: 1.03 (ref 1–1.03)
SP GR UR STRIP: >1.03 (ref 1–1.03)
SQUAMOUS #/AREA URNS HPF: ABNORMAL /HPF
T AXIS (DEGREES): 53
TRIGL SERPL-MCNC: 57 MG/DL
TROPONIN I SERPL DL<=0.01 NG/ML-MCNC: <4 NG/L
TYPE AND SCREEN EXPIRATION DATE: NORMAL
TYPE AND SCREEN EXPIRATION DATE: NORMAL
UNIT BLOOD TYPE: NORMAL
UNIT NUMBER: NORMAL
UROBILINOGEN UR STRIP-MCNC: 0.2 MG/DL
UROBILINOGEN UR STRIP-MCNC: 2 MG/DL
VENTRICULAR RATE EKG/MIN (BPM): 125
WBC # BLD: 10 K/MCL (ref 4.2–11)
WBC # BLD: 10.1 K/MCL (ref 4.2–11)
WBC # BLD: 10.3 K/MCL (ref 4.2–11)
WBC # BLD: 10.6 K/MCL (ref 4.2–11)
WBC # BLD: 10.7 K/MCL (ref 4.2–11)
WBC # BLD: 16.9 K/MCL (ref 4.2–11)
WBC # BLD: 7.2 K/MCL (ref 4.2–11)
WBC # BLD: 7.7 K/MCL (ref 4.2–11)
WBC # BLD: 7.8 K/MCL (ref 4.2–11)
WBC # BLD: 8.8 K/MCL (ref 4.2–11)
WBC # BLD: 9.5 K/MCL (ref 4.2–11)
WBC # BLD: 9.5 K/MCL (ref 4.2–11)
WBC # BLD: 9.6 K/MCL (ref 4.2–11)
WBC # BLD: 9.9 K/MCL (ref 4.2–11)
WBC #/AREA URNS HPF: ABNORMAL /HPF

## 2023-01-01 PROCEDURE — 10002800 HB RX 250 W HCPCS: Performed by: INTERNAL MEDICINE

## 2023-01-01 PROCEDURE — 82550 ASSAY OF CK (CPK): CPT

## 2023-01-01 PROCEDURE — 10002801 HB RX 250 W/O HCPCS: Performed by: SURGERY

## 2023-01-01 PROCEDURE — 84100 ASSAY OF PHOSPHORUS: CPT | Performed by: NURSE PRACTITIONER

## 2023-01-01 PROCEDURE — 83930 ASSAY OF BLOOD OSMOLALITY: CPT

## 2023-01-01 PROCEDURE — 83930 ASSAY OF BLOOD OSMOLALITY: CPT | Performed by: NEUROLOGICAL SURGERY

## 2023-01-01 PROCEDURE — 99291 CRITICAL CARE FIRST HOUR: CPT

## 2023-01-01 PROCEDURE — 10006023 HB SUPPLY 272

## 2023-01-01 PROCEDURE — 10002800 HB RX 250 W HCPCS

## 2023-01-01 PROCEDURE — 94003 VENT MGMT INPAT SUBQ DAY: CPT

## 2023-01-01 PROCEDURE — 10002803 HB RX 637: Performed by: NURSE PRACTITIONER

## 2023-01-01 PROCEDURE — 82803 BLOOD GASES ANY COMBINATION: CPT

## 2023-01-01 PROCEDURE — 84100 ASSAY OF PHOSPHORUS: CPT

## 2023-01-01 PROCEDURE — 84484 ASSAY OF TROPONIN QUANT: CPT

## 2023-01-01 PROCEDURE — 10004651 HB RX, NO CHARGE ITEM

## 2023-01-01 PROCEDURE — 85379 FIBRIN DEGRADATION QUANT: CPT

## 2023-01-01 PROCEDURE — 10002803 HB RX 637: Performed by: SURGERY

## 2023-01-01 PROCEDURE — G0390 TRAUMA RESPONS W/HOSP CRITI: HCPCS

## 2023-01-01 PROCEDURE — 82248 BILIRUBIN DIRECT: CPT

## 2023-01-01 PROCEDURE — 85025 COMPLETE CBC W/AUTO DIFF WBC: CPT | Performed by: NURSE PRACTITIONER

## 2023-01-01 PROCEDURE — 10002803 HB RX 637

## 2023-01-01 PROCEDURE — 10002800 HB RX 250 W HCPCS: Performed by: STUDENT IN AN ORGANIZED HEALTH CARE EDUCATION/TRAINING PROGRAM

## 2023-01-01 PROCEDURE — 10004180 HB COUNTER-TRANSPORT

## 2023-01-01 PROCEDURE — 36573 INSJ PICC RS&I 5 YR+: CPT

## 2023-01-01 PROCEDURE — 84132 ASSAY OF SERUM POTASSIUM: CPT | Performed by: NURSE PRACTITIONER

## 2023-01-01 PROCEDURE — 10004651 HB RX, NO CHARGE ITEM: Performed by: NURSE PRACTITIONER

## 2023-01-01 PROCEDURE — 72125 CT NECK SPINE W/O DYE: CPT

## 2023-01-01 PROCEDURE — 99292 CRITICAL CARE ADDL 30 MIN: CPT

## 2023-01-01 PROCEDURE — 80048 BASIC METABOLIC PNL TOTAL CA: CPT

## 2023-01-01 PROCEDURE — 85384 FIBRINOGEN ACTIVITY: CPT

## 2023-01-01 PROCEDURE — 85018 HEMOGLOBIN: CPT

## 2023-01-01 PROCEDURE — 70450 CT HEAD/BRAIN W/O DYE: CPT

## 2023-01-01 PROCEDURE — 10002801 HB RX 250 W/O HCPCS

## 2023-01-01 PROCEDURE — 96367 TX/PROPH/DG ADDL SEQ IV INF: CPT

## 2023-01-01 PROCEDURE — 10004281 HB COUNTER-STAFF TIME PER 15 MIN

## 2023-01-01 PROCEDURE — 84100 ASSAY OF PHOSPHORUS: CPT | Performed by: SURGERY

## 2023-01-01 PROCEDURE — 10002807 HB RX 258

## 2023-01-01 PROCEDURE — 83735 ASSAY OF MAGNESIUM: CPT

## 2023-01-01 PROCEDURE — 81003 URINALYSIS AUTO W/O SCOPE: CPT | Performed by: NURSE PRACTITIONER

## 2023-01-01 PROCEDURE — 10002801 HB RX 250 W/O HCPCS: Performed by: NURSE PRACTITIONER

## 2023-01-01 PROCEDURE — 0BH17EZ INSERTION OF ENDOTRACHEAL AIRWAY INTO TRACHEA, VIA NATURAL OR ARTIFICIAL OPENING: ICD-10-PCS

## 2023-01-01 PROCEDURE — 71045 X-RAY EXAM CHEST 1 VIEW: CPT

## 2023-01-01 PROCEDURE — 96372 THER/PROPH/DIAG INJ SC/IM: CPT | Performed by: SURGERY

## 2023-01-01 PROCEDURE — 85730 THROMBOPLASTIN TIME PARTIAL: CPT

## 2023-01-01 PROCEDURE — 80053 COMPREHEN METABOLIC PANEL: CPT

## 2023-01-01 PROCEDURE — 82150 ASSAY OF AMYLASE: CPT

## 2023-01-01 PROCEDURE — 70544 MR ANGIOGRAPHY HEAD W/O DYE: CPT

## 2023-01-01 PROCEDURE — 10002800 HB RX 250 W HCPCS: Performed by: SURGERY

## 2023-01-01 PROCEDURE — 83690 ASSAY OF LIPASE: CPT

## 2023-01-01 PROCEDURE — 83735 ASSAY OF MAGNESIUM: CPT | Performed by: NURSE PRACTITIONER

## 2023-01-01 PROCEDURE — 85610 PROTHROMBIN TIME: CPT

## 2023-01-01 PROCEDURE — 10002800 HB RX 250 W HCPCS: Performed by: NURSE PRACTITIONER

## 2023-01-01 PROCEDURE — 83615 LACTATE (LD) (LDH) ENZYME: CPT

## 2023-01-01 PROCEDURE — 00P0X0Z REMOVAL OF DRAINAGE DEVICE FROM BRAIN, EXTERNAL APPROACH: ICD-10-PCS | Performed by: NEUROLOGICAL SURGERY

## 2023-01-01 PROCEDURE — 80048 BASIC METABOLIC PNL TOTAL CA: CPT | Performed by: SURGERY

## 2023-01-01 PROCEDURE — 85025 COMPLETE CBC W/AUTO DIFF WBC: CPT

## 2023-01-01 PROCEDURE — 10002807 HB RX 258: Performed by: STUDENT IN AN ORGANIZED HEALTH CARE EDUCATION/TRAINING PROGRAM

## 2023-01-01 PROCEDURE — 84132 ASSAY OF SERUM POTASSIUM: CPT

## 2023-01-01 PROCEDURE — 10000008 HB ROOM CHARGE ICU OR CCU

## 2023-01-01 PROCEDURE — 86901 BLOOD TYPING SEROLOGIC RH(D): CPT | Performed by: SURGERY

## 2023-01-01 PROCEDURE — 82805 BLOOD GASES W/O2 SATURATION: CPT

## 2023-01-01 PROCEDURE — 81001 URINALYSIS AUTO W/SCOPE: CPT

## 2023-01-01 PROCEDURE — 13000003 HB ANESTHESIA  GENERAL EA ADD MINUTE: Performed by: NEUROLOGICAL SURGERY

## 2023-01-01 PROCEDURE — 13000002 HB ANESTHESIA  GENERAL  S/U + 1ST 15 MIN: Performed by: NEUROLOGICAL SURGERY

## 2023-01-01 PROCEDURE — 31500 INSERT EMERGENCY AIRWAY: CPT

## 2023-01-01 PROCEDURE — 5A1955Z RESPIRATORY VENTILATION, GREATER THAN 96 CONSECUTIVE HOURS: ICD-10-PCS

## 2023-01-01 PROCEDURE — 85610 PROTHROMBIN TIME: CPT | Performed by: NURSE PRACTITIONER

## 2023-01-01 PROCEDURE — 10002800 HB RX 250 W HCPCS: Performed by: NEUROLOGICAL SURGERY

## 2023-01-01 PROCEDURE — 10002807 HB RX 258: Performed by: NURSE PRACTITIONER

## 2023-01-01 PROCEDURE — 83930 ASSAY OF BLOOD OSMOLALITY: CPT | Performed by: NURSE PRACTITIONER

## 2023-01-01 PROCEDURE — 51702 INSERT TEMP BLADDER CATH: CPT

## 2023-01-01 PROCEDURE — 82947 ASSAY GLUCOSE BLOOD QUANT: CPT

## 2023-01-01 PROCEDURE — 86923 COMPATIBILITY TEST ELECTRIC: CPT

## 2023-01-01 PROCEDURE — 96372 THER/PROPH/DIAG INJ SC/IM: CPT

## 2023-01-01 PROCEDURE — 90715 TDAP VACCINE 7 YRS/> IM: CPT

## 2023-01-01 PROCEDURE — 96365 THER/PROPH/DIAG IV INF INIT: CPT

## 2023-01-01 PROCEDURE — 99024 POSTOP FOLLOW-UP VISIT: CPT | Performed by: NURSE PRACTITIONER

## 2023-01-01 PROCEDURE — 00C40ZZ EXTIRPATION OF MATTER FROM INTRACRANIAL SUBDURAL SPACE, OPEN APPROACH: ICD-10-PCS | Performed by: NEUROLOGICAL SURGERY

## 2023-01-01 PROCEDURE — 83735 ASSAY OF MAGNESIUM: CPT | Performed by: SURGERY

## 2023-01-01 PROCEDURE — 00U20KZ SUPPLEMENT DURA MATER WITH NONAUTOLOGOUS TISSUE SUBSTITUTE, OPEN APPROACH: ICD-10-PCS | Performed by: NEUROLOGICAL SURGERY

## 2023-01-01 PROCEDURE — 13000111 HB NEURO COMPLEX CASE EA ADD MINUTE: Performed by: NEUROLOGICAL SURGERY

## 2023-01-01 PROCEDURE — 84478 ASSAY OF TRIGLYCERIDES: CPT

## 2023-01-01 PROCEDURE — 83050 HGB METHEMOGLOBIN QUAN: CPT

## 2023-01-01 PROCEDURE — 99291 CRITICAL CARE FIRST HOUR: CPT | Performed by: SURGERY

## 2023-01-01 PROCEDURE — 10002805 HB CONTRAST AGENT

## 2023-01-01 PROCEDURE — 70553 MRI BRAIN STEM W/O & W/DYE: CPT

## 2023-01-01 PROCEDURE — 10002801 HB RX 250 W/O HCPCS: Performed by: STUDENT IN AN ORGANIZED HEALTH CARE EDUCATION/TRAINING PROGRAM

## 2023-01-01 PROCEDURE — 80048 BASIC METABOLIC PNL TOTAL CA: CPT | Performed by: NURSE PRACTITIONER

## 2023-01-01 PROCEDURE — 81003 URINALYSIS AUTO W/O SCOPE: CPT

## 2023-01-01 PROCEDURE — 90471 IMMUNIZATION ADMIN: CPT

## 2023-01-01 PROCEDURE — 00N00ZZ RELEASE BRAIN, OPEN APPROACH: ICD-10-PCS | Performed by: NEUROLOGICAL SURGERY

## 2023-01-01 PROCEDURE — 10002801 HB RX 250 W/O HCPCS: Performed by: NEUROLOGICAL SURGERY

## 2023-01-01 PROCEDURE — 74177 CT ABD & PELVIS W/CONTRAST: CPT

## 2023-01-01 PROCEDURE — 10006027 HB SUPPLY 278: Performed by: NEUROLOGICAL SURGERY

## 2023-01-01 PROCEDURE — 85730 THROMBOPLASTIN TIME PARTIAL: CPT | Performed by: NURSE PRACTITIONER

## 2023-01-01 PROCEDURE — P9016 RBC LEUKOCYTES REDUCED: HCPCS

## 2023-01-01 PROCEDURE — 99024 POSTOP FOLLOW-UP VISIT: CPT | Performed by: PHYSICIAN ASSISTANT

## 2023-01-01 PROCEDURE — 70486 CT MAXILLOFACIAL W/O DYE: CPT

## 2023-01-01 PROCEDURE — 84132 ASSAY OF SERUM POTASSIUM: CPT | Performed by: SURGERY

## 2023-01-01 PROCEDURE — 71260 CT THORAX DX C+: CPT

## 2023-01-01 PROCEDURE — 82330 ASSAY OF CALCIUM: CPT

## 2023-01-01 PROCEDURE — 80307 DRUG TEST PRSMV CHEM ANLYZR: CPT

## 2023-01-01 PROCEDURE — 13000110 HB NEURO COMPLEX CASE S/U + 1ST 15 MIN: Performed by: NEUROLOGICAL SURGERY

## 2023-01-01 PROCEDURE — 86850 RBC ANTIBODY SCREEN: CPT

## 2023-01-01 PROCEDURE — 93005 ELECTROCARDIOGRAM TRACING: CPT | Performed by: NURSE PRACTITIONER

## 2023-01-01 PROCEDURE — C1751 CATH, INF, PER/CENT/MIDLINE: HCPCS

## 2023-01-01 PROCEDURE — 84295 ASSAY OF SERUM SODIUM: CPT | Performed by: SURGERY

## 2023-01-01 PROCEDURE — 10002807 HB RX 258: Performed by: SURGERY

## 2023-01-01 PROCEDURE — 10006023 HB SUPPLY 272: Performed by: NEUROLOGICAL SURGERY

## 2023-01-01 PROCEDURE — 94002 VENT MGMT INPAT INIT DAY: CPT

## 2023-01-01 PROCEDURE — C1763 CONN TISS, NON-HUMAN: HCPCS | Performed by: NEUROLOGICAL SURGERY

## 2023-01-01 PROCEDURE — A9585 GADOBUTROL INJECTION: HCPCS

## 2023-01-01 PROCEDURE — 82077 ASSAY SPEC XCP UR&BREATH IA: CPT

## 2023-01-01 PROCEDURE — 36600 WITHDRAWAL OF ARTERIAL BLOOD: CPT

## 2023-01-01 PROCEDURE — 85576 BLOOD PLATELET AGGREGATION: CPT | Performed by: NURSE PRACTITIONER

## 2023-01-01 DEVICE — DURAGEN® PLUS DURAL REGENERATION MATRIX, 5 IN X 7 IN (12.5 CM X 17.5 CM)
Type: IMPLANTABLE DEVICE | Site: HEAD | Status: FUNCTIONAL
Brand: DURAGEN® PLUS

## 2023-01-01 DEVICE — HORIZON TI SMALL RED  24 CLIPS/POUCH
Type: IMPLANTABLE DEVICE | Site: HEAD | Status: FUNCTIONAL
Brand: WECK

## 2023-01-01 DEVICE — FLOSEAL HEMOSTATIC MATRIX, 10ML
Type: IMPLANTABLE DEVICE | Site: HEAD | Status: FUNCTIONAL
Brand: FLOSEAL HEMOSTATIC MATRIX

## 2023-01-01 DEVICE — IMPLANTABLE DEVICE
Type: IMPLANTABLE DEVICE | Site: HEAD | Status: FUNCTIONAL
Brand: SURGIFOAM® ABSORBABLE GELATIN SPONGE, U.S.P.

## 2023-01-01 RX ORDER — CHLORHEXIDINE GLUCONATE ORAL RINSE 1.2 MG/ML
15 SOLUTION DENTAL PRN
Status: DISCONTINUED | OUTPATIENT
Start: 2023-01-01 | End: 2023-01-01 | Stop reason: HOSPADM

## 2023-01-01 RX ORDER — ONDANSETRON 2 MG/ML
4 INJECTION INTRAMUSCULAR; INTRAVENOUS EVERY 4 HOURS PRN
Status: DISCONTINUED | OUTPATIENT
Start: 2023-01-01 | End: 2023-01-01 | Stop reason: HOSPADM

## 2023-01-01 RX ORDER — SODIUM CHLORIDE 9 MG/ML
INJECTION, SOLUTION INTRAVENOUS CONTINUOUS PRN
Status: DISCONTINUED | OUTPATIENT
Start: 2023-01-01 | End: 2023-01-01

## 2023-01-01 RX ORDER — SODIUM CHLORIDE 9 MG/ML
INJECTION, SOLUTION INTRAVENOUS CONTINUOUS PRN
Status: DISCONTINUED | OUTPATIENT
Start: 2023-01-01 | End: 2023-01-01 | Stop reason: HOSPADM

## 2023-01-01 RX ORDER — 0.9 % SODIUM CHLORIDE 0.9 %
2 VIAL (ML) INJECTION EVERY 12 HOURS SCHEDULED
Status: DISCONTINUED | OUTPATIENT
Start: 2023-01-01 | End: 2023-01-01

## 2023-01-01 RX ORDER — LORAZEPAM 2 MG/ML
1 INJECTION INTRAMUSCULAR
Status: DISCONTINUED | OUTPATIENT
Start: 2023-01-01 | End: 2023-01-01 | Stop reason: HOSPADM

## 2023-01-01 RX ORDER — MORPHINE SULFATE 20 MG/ML
10 SOLUTION ORAL
Status: DISCONTINUED | OUTPATIENT
Start: 2023-01-01 | End: 2023-01-01 | Stop reason: HOSPADM

## 2023-01-01 RX ORDER — HYDRALAZINE HYDROCHLORIDE 20 MG/ML
20 INJECTION INTRAMUSCULAR; INTRAVENOUS EVERY 6 HOURS PRN
Status: DISCONTINUED | OUTPATIENT
Start: 2023-01-01 | End: 2023-01-01

## 2023-01-01 RX ORDER — ACETAMINOPHEN 160 MG/5ML
650 LIQUID ORAL EVERY 6 HOURS PRN
Status: DISCONTINUED | OUTPATIENT
Start: 2023-01-01 | End: 2023-01-01 | Stop reason: HOSPADM

## 2023-01-01 RX ORDER — MORPHINE SULFATE 15 MG/1
15 TABLET ORAL
Status: DISCONTINUED | OUTPATIENT
Start: 2023-01-01 | End: 2023-01-01

## 2023-01-01 RX ORDER — FAMOTIDINE 10 MG/ML
20 INJECTION, SOLUTION INTRAVENOUS EVERY 12 HOURS SCHEDULED
Status: DISCONTINUED | OUTPATIENT
Start: 2023-01-01 | End: 2023-01-01

## 2023-01-01 RX ORDER — SODIUM CHLORIDE 9 MG/ML
INJECTION, SOLUTION INTRAVENOUS CONTINUOUS
Status: DISCONTINUED | OUTPATIENT
Start: 2023-01-01 | End: 2023-01-01 | Stop reason: HOSPADM

## 2023-01-01 RX ORDER — MANNITOL 20 G/100ML
90 INJECTION, SOLUTION INTRAVENOUS ONCE
Status: COMPLETED | OUTPATIENT
Start: 2023-01-01 | End: 2023-01-01

## 2023-01-01 RX ORDER — 0.9 % SODIUM CHLORIDE 0.9 %
10 VIAL (ML) INJECTION EVERY 12 HOURS SCHEDULED
Status: DISCONTINUED | OUTPATIENT
Start: 2023-01-01 | End: 2023-01-01 | Stop reason: HOSPADM

## 2023-01-01 RX ORDER — CHLORHEXIDINE GLUCONATE ORAL RINSE 1.2 MG/ML
15 SOLUTION DENTAL EVERY 12 HOURS SCHEDULED
Status: DISCONTINUED | OUTPATIENT
Start: 2023-01-01 | End: 2023-01-01 | Stop reason: HOSPADM

## 2023-01-01 RX ORDER — 0.9 % SODIUM CHLORIDE 0.9 %
10 VIAL (ML) INJECTION PRN
Status: DISCONTINUED | OUTPATIENT
Start: 2023-01-01 | End: 2023-01-01

## 2023-01-01 RX ORDER — HYDRALAZINE HYDROCHLORIDE 20 MG/ML
20 INJECTION INTRAMUSCULAR; INTRAVENOUS EVERY 4 HOURS PRN
Status: DISCONTINUED | OUTPATIENT
Start: 2023-01-01 | End: 2023-01-01 | Stop reason: HOSPADM

## 2023-01-01 RX ORDER — HEPARIN SODIUM 5000 [USP'U]/ML
5000 INJECTION, SOLUTION INTRAVENOUS; SUBCUTANEOUS EVERY 8 HOURS SCHEDULED
Status: DISCONTINUED | OUTPATIENT
Start: 2023-01-01 | End: 2023-01-01

## 2023-01-01 RX ORDER — NICOTINE POLACRILEX 4 MG
30 LOZENGE BUCCAL PRN
Status: DISCONTINUED | OUTPATIENT
Start: 2023-01-01 | End: 2023-01-01 | Stop reason: HOSPADM

## 2023-01-01 RX ORDER — CARBOXYMETHYLCELLULOSE SODIUM 5 MG/ML
1 SOLUTION/ DROPS OPHTHALMIC PRN
Status: DISCONTINUED | OUTPATIENT
Start: 2023-01-01 | End: 2023-01-01 | Stop reason: HOSPADM

## 2023-01-01 RX ORDER — POTASSIUM CHLORIDE 1.5 G/1.58G
40 POWDER, FOR SOLUTION ORAL ONCE
Status: COMPLETED | OUTPATIENT
Start: 2023-01-01 | End: 2023-01-01

## 2023-01-01 RX ORDER — POTASSIUM CHLORIDE 14.9 MG/ML
20 INJECTION INTRAVENOUS ONCE
Status: COMPLETED | OUTPATIENT
Start: 2023-01-01 | End: 2023-01-01

## 2023-01-01 RX ORDER — HYDRALAZINE HYDROCHLORIDE 20 MG/ML
10 INJECTION INTRAMUSCULAR; INTRAVENOUS EVERY 6 HOURS PRN
Status: DISCONTINUED | OUTPATIENT
Start: 2023-01-01 | End: 2023-01-01

## 2023-01-01 RX ORDER — LEVETIRACETAM 500 MG/5ML
1000 INJECTION, SOLUTION, CONCENTRATE INTRAVENOUS ONCE
Status: COMPLETED | OUTPATIENT
Start: 2023-01-01 | End: 2023-01-01

## 2023-01-01 RX ORDER — GADOBUTROL 604.72 MG/ML
10 INJECTION INTRAVENOUS ONCE
Status: COMPLETED | OUTPATIENT
Start: 2023-01-01 | End: 2023-01-01

## 2023-01-01 RX ORDER — LIDOCAINE HYDROCHLORIDE 20 MG/ML
10 JELLY TOPICAL PRN
Status: DISCONTINUED | OUTPATIENT
Start: 2023-01-01 | End: 2023-01-01 | Stop reason: HOSPADM

## 2023-01-01 RX ORDER — NICOTINE POLACRILEX 4 MG
15 LOZENGE BUCCAL PRN
Status: DISCONTINUED | OUTPATIENT
Start: 2023-01-01 | End: 2023-01-01 | Stop reason: HOSPADM

## 2023-01-01 RX ORDER — BISACODYL 10 MG
10 SUPPOSITORY, RECTAL RECTAL DAILY PRN
Status: DISCONTINUED | OUTPATIENT
Start: 2023-01-01 | End: 2023-01-01 | Stop reason: HOSPADM

## 2023-01-01 RX ORDER — DEXTROSE MONOHYDRATE 25 G/50ML
12.5 INJECTION, SOLUTION INTRAVENOUS PRN
Status: DISCONTINUED | OUTPATIENT
Start: 2023-01-01 | End: 2023-01-01 | Stop reason: HOSPADM

## 2023-01-01 RX ORDER — ETOMIDATE 2 MG/ML
20 INJECTION INTRAVENOUS ONCE
Status: COMPLETED | OUTPATIENT
Start: 2023-01-01 | End: 2023-01-01

## 2023-01-01 RX ORDER — ONDANSETRON 4 MG/1
4 TABLET, ORALLY DISINTEGRATING ORAL EVERY 12 HOURS PRN
Status: DISCONTINUED | OUTPATIENT
Start: 2023-01-01 | End: 2023-01-01

## 2023-01-01 RX ORDER — AMOXICILLIN 250 MG
2 CAPSULE ORAL 2 TIMES DAILY
Status: DISCONTINUED | OUTPATIENT
Start: 2023-01-01 | End: 2023-01-01 | Stop reason: HOSPADM

## 2023-01-01 RX ORDER — HEPARIN SODIUM 1000 [USP'U]/ML
300 INJECTION, SOLUTION INTRAVENOUS; SUBCUTANEOUS
Status: COMPLETED | OUTPATIENT
Start: 2023-01-01 | End: 2023-01-01

## 2023-01-01 RX ORDER — MAGNESIUM HYDROXIDE/ALUMINUM HYDROXICE/SIMETHICONE 120; 1200; 1200 MG/30ML; MG/30ML; MG/30ML
30 SUSPENSION ORAL PRN
Status: DISCONTINUED | OUTPATIENT
Start: 2023-01-01 | End: 2023-01-01 | Stop reason: HOSPADM

## 2023-01-01 RX ORDER — PHENTERMINE HYDROCHLORIDE 37.5 MG/1
37.5 TABLET ORAL
COMMUNITY
Start: 2023-01-01

## 2023-01-01 RX ORDER — SODIUM BICARBONATE 650 MG/1
650 TABLET ORAL PRN
Status: DISCONTINUED | OUTPATIENT
Start: 2023-01-01 | End: 2023-01-01 | Stop reason: HOSPADM

## 2023-01-01 RX ORDER — ASPIRIN 81 MG/1
81 TABLET, CHEWABLE ORAL DAILY
Status: DISCONTINUED | OUTPATIENT
Start: 2023-01-01 | End: 2023-01-01 | Stop reason: HOSPADM

## 2023-01-01 RX ORDER — LEVETIRACETAM 100 MG/ML
500 SOLUTION ORAL EVERY 12 HOURS SCHEDULED
Status: COMPLETED | OUTPATIENT
Start: 2023-01-01 | End: 2023-01-01

## 2023-01-01 RX ORDER — MINERAL OIL AND WHITE PETROLATUM 150; 830 MG/G; MG/G
1 OINTMENT OPHTHALMIC
Status: DISCONTINUED | OUTPATIENT
Start: 2023-01-01 | End: 2023-01-01 | Stop reason: HOSPADM

## 2023-01-01 RX ORDER — DEXTROSE MONOHYDRATE 25 G/50ML
25 INJECTION, SOLUTION INTRAVENOUS PRN
Status: DISCONTINUED | OUTPATIENT
Start: 2023-01-01 | End: 2023-01-01 | Stop reason: HOSPADM

## 2023-01-01 RX ORDER — MANNITOL 20 G/100ML
INJECTION, SOLUTION INTRAVENOUS
Status: COMPLETED
Start: 2023-01-01 | End: 2023-01-01

## 2023-01-01 RX ORDER — ENALAPRIL MALEATE 5 MG/1
5 TABLET ORAL 2 TIMES DAILY
Status: DISCONTINUED | OUTPATIENT
Start: 2023-01-01 | End: 2023-01-01 | Stop reason: HOSPADM

## 2023-01-01 RX ORDER — MANNITOL 20 G/100ML
25 INJECTION, SOLUTION INTRAVENOUS EVERY 6 HOURS SCHEDULED
Status: DISCONTINUED | OUTPATIENT
Start: 2023-01-01 | End: 2023-01-01

## 2023-01-01 RX ORDER — MORPHINE SULFATE 20 MG/ML
10 SOLUTION ORAL
Status: DISCONTINUED | OUTPATIENT
Start: 2023-01-01 | End: 2023-01-01

## 2023-01-01 RX ORDER — ENOXAPARIN SODIUM 100 MG/ML
30 INJECTION SUBCUTANEOUS EVERY 12 HOURS SCHEDULED
Status: DISCONTINUED | OUTPATIENT
Start: 2023-01-01 | End: 2023-01-01 | Stop reason: HOSPADM

## 2023-01-01 RX ORDER — ENOXAPARIN SODIUM 100 MG/ML
30 INJECTION SUBCUTANEOUS EVERY 12 HOURS
Status: DISCONTINUED | OUTPATIENT
Start: 2023-01-01 | End: 2023-01-01

## 2023-01-01 RX ORDER — 0.9 % SODIUM CHLORIDE 0.9 %
10 VIAL (ML) INJECTION PRN
Status: DISCONTINUED | OUTPATIENT
Start: 2023-01-01 | End: 2023-01-01 | Stop reason: HOSPADM

## 2023-01-01 RX ORDER — ENALAPRIL MALEATE 5 MG/1
5 TABLET ORAL DAILY
Status: DISCONTINUED | OUTPATIENT
Start: 2023-01-01 | End: 2023-01-01

## 2023-01-01 RX ORDER — POLYETHYLENE GLYCOL 3350 17 G/17G
17 POWDER, FOR SOLUTION ORAL DAILY
Status: COMPLETED | OUTPATIENT
Start: 2023-01-01 | End: 2023-01-01

## 2023-01-01 RX ORDER — SODIUM CHLORIDE 9 MG/ML
INJECTION, SOLUTION INTRAVENOUS PRN
Status: DISCONTINUED | OUTPATIENT
Start: 2023-01-01 | End: 2023-01-01 | Stop reason: HOSPADM

## 2023-01-01 RX ORDER — LEVETIRACETAM 500 MG/5ML
500 INJECTION, SOLUTION, CONCENTRATE INTRAVENOUS EVERY 12 HOURS SCHEDULED
Status: DISCONTINUED | OUTPATIENT
Start: 2023-01-01 | End: 2023-01-01

## 2023-01-01 RX ORDER — FUROSEMIDE 10 MG/ML
40 INJECTION INTRAMUSCULAR; INTRAVENOUS ONCE
Status: COMPLETED | OUTPATIENT
Start: 2023-01-01 | End: 2023-01-01

## 2023-01-01 RX ORDER — ENALAPRIL MALEATE 5 MG/1
5 TABLET ORAL DAILY
COMMUNITY
Start: 2023-01-01

## 2023-01-01 RX ORDER — ROCURONIUM BROMIDE 10 MG/ML
INJECTION, SOLUTION INTRAVENOUS PRN
Status: DISCONTINUED | OUTPATIENT
Start: 2023-01-01 | End: 2023-01-01

## 2023-01-01 RX ORDER — MORPHINE SULFATE 15 MG/1
15 TABLET ORAL
Status: DISCONTINUED | OUTPATIENT
Start: 2023-01-01 | End: 2023-01-01 | Stop reason: HOSPADM

## 2023-01-01 RX ORDER — CLINDAMYCIN HYDROCHLORIDE 300 MG/1
300 CAPSULE ORAL 3 TIMES DAILY
COMMUNITY
Start: 2023-01-01 | End: 2023-01-01

## 2023-01-01 RX ORDER — GLYCOPYRROLATE 0.2 MG/ML
0.2 INJECTION, SOLUTION INTRAMUSCULAR; INTRAVENOUS EVERY 4 HOURS PRN
Status: DISCONTINUED | OUTPATIENT
Start: 2023-01-01 | End: 2023-01-01 | Stop reason: HOSPADM

## 2023-01-01 RX ORDER — MANNITOL 20 G/100ML
25 INJECTION, SOLUTION INTRAVENOUS EVERY 6 HOURS SCHEDULED
Status: COMPLETED | OUTPATIENT
Start: 2023-01-01 | End: 2023-01-01

## 2023-01-01 RX ORDER — FAMOTIDINE 20 MG/1
20 TABLET, FILM COATED ORAL EVERY 12 HOURS SCHEDULED
Status: DISCONTINUED | OUTPATIENT
Start: 2023-01-01 | End: 2023-01-01 | Stop reason: HOSPADM

## 2023-01-01 RX ORDER — 0.9 % SODIUM CHLORIDE 0.9 %
20 VIAL (ML) INJECTION PRN
Status: DISCONTINUED | OUTPATIENT
Start: 2023-01-01 | End: 2023-01-01

## 2023-01-01 RX ORDER — LORAZEPAM 1 MG/1
0.5 TABLET ORAL
Status: DISCONTINUED | OUTPATIENT
Start: 2023-01-01 | End: 2023-01-01 | Stop reason: HOSPADM

## 2023-01-01 RX ORDER — ONDANSETRON 2 MG/ML
4 INJECTION INTRAMUSCULAR; INTRAVENOUS EVERY 12 HOURS PRN
Status: DISCONTINUED | OUTPATIENT
Start: 2023-01-01 | End: 2023-01-01

## 2023-01-01 RX ORDER — LORAZEPAM 2 MG/ML
0.5 INJECTION INTRAMUSCULAR
Status: DISCONTINUED | OUTPATIENT
Start: 2023-01-01 | End: 2023-01-01

## 2023-01-01 RX ADMIN — Medication 75 MCG/HR: at 16:54

## 2023-01-01 RX ADMIN — ENOXAPARIN SODIUM 30 MG: 30 INJECTION SUBCUTANEOUS at 08:02

## 2023-01-01 RX ADMIN — SODIUM CHLORIDE, PRESERVATIVE FREE 2 ML: 5 INJECTION INTRAVENOUS at 20:08

## 2023-01-01 RX ADMIN — SENNOSIDES AND DOCUSATE SODIUM 2 TABLET: 50; 8.6 TABLET ORAL at 08:02

## 2023-01-01 RX ADMIN — Medication 50 MCG/HR: at 11:25

## 2023-01-01 RX ADMIN — SODIUM CHLORIDE: 9 INJECTION, SOLUTION INTRAVENOUS at 18:36

## 2023-01-01 RX ADMIN — MINERAL OIL, PETROLATUM 1 APPLICATION.: 425; 568 OINTMENT OPHTHALMIC at 04:54

## 2023-01-01 RX ADMIN — LEVETIRACETAM 500 MG: 100 SOLUTION ORAL at 20:00

## 2023-01-01 RX ADMIN — SODIUM CHLORIDE, PRESERVATIVE FREE 10 ML: 5 INJECTION INTRAVENOUS at 22:00

## 2023-01-01 RX ADMIN — SENNOSIDES AND DOCUSATE SODIUM 2 TABLET: 50; 8.6 TABLET ORAL at 22:00

## 2023-01-01 RX ADMIN — MINERAL OIL, PETROLATUM 1 APPLICATION.: 425; 568 OINTMENT OPHTHALMIC at 00:04

## 2023-01-01 RX ADMIN — SENNOSIDES AND DOCUSATE SODIUM 2 TABLET: 50; 8.6 TABLET ORAL at 21:16

## 2023-01-01 RX ADMIN — PROPOFOL INJECTABLE EMULSION 20 MCG/KG/MIN: 10 INJECTION, EMULSION INTRAVENOUS at 13:15

## 2023-01-01 RX ADMIN — SODIUM CHLORIDE: 9 INJECTION, SOLUTION INTRAVENOUS at 18:35

## 2023-01-01 RX ADMIN — FAMOTIDINE 20 MG: 20 TABLET, FILM COATED ORAL at 16:58

## 2023-01-01 RX ADMIN — CHLORHEXIDINE GLUCONATE 15 ML: 1.2 RINSE ORAL at 08:55

## 2023-01-01 RX ADMIN — LABETALOL HYDROCHLORIDE 20 MG: 5 INJECTION, SOLUTION INTRAVENOUS at 21:20

## 2023-01-01 RX ADMIN — Medication 200 MCG: at 19:20

## 2023-01-01 RX ADMIN — ENALAPRIL MALEATE 5 MG: 5 TABLET ORAL at 08:22

## 2023-01-01 RX ADMIN — MORPHINE SULFATE 4 MG: 2 INJECTION, SOLUTION INTRAMUSCULAR; INTRAVENOUS at 09:15

## 2023-01-01 RX ADMIN — ACETAMINOPHEN 650 MG: 650 SOLUTION ORAL at 17:56

## 2023-01-01 RX ADMIN — MINERAL OIL, PETROLATUM 1 APPLICATION.: 425; 568 OINTMENT OPHTHALMIC at 07:44

## 2023-01-01 RX ADMIN — MORPHINE SULFATE 4 MG: 2 INJECTION, SOLUTION INTRAMUSCULAR; INTRAVENOUS at 09:30

## 2023-01-01 RX ADMIN — PROPOFOL INJECTABLE EMULSION 30 MCG/KG/MIN: 10 INJECTION, EMULSION INTRAVENOUS at 14:30

## 2023-01-01 RX ADMIN — MANNITOL 90 G: 20 INJECTION, SOLUTION INTRAVENOUS at 16:42

## 2023-01-01 RX ADMIN — SODIUM CHLORIDE, PRESERVATIVE FREE 10 ML: 5 INJECTION INTRAVENOUS at 20:08

## 2023-01-01 RX ADMIN — FAMOTIDINE 20 MG: 20 TABLET, FILM COATED ORAL at 20:56

## 2023-01-01 RX ADMIN — INSULIN LISPRO 3 UNITS: 100 INJECTION, SOLUTION INTRAVENOUS; SUBCUTANEOUS at 06:07

## 2023-01-01 RX ADMIN — CHLORHEXIDINE GLUCONATE 15 ML: 1.2 RINSE ORAL at 08:17

## 2023-01-01 RX ADMIN — CHLORHEXIDINE GLUCONATE 15 ML: 1.2 RINSE ORAL at 21:10

## 2023-01-01 RX ADMIN — SODIUM PHOSPHATE, MONOBASIC, MONOHYDRATE AND SODIUM PHOSPHATE, DIBASIC, ANHYDROUS 45 MMOL: 142; 276 INJECTION, SOLUTION INTRAVENOUS at 08:21

## 2023-01-01 RX ADMIN — SUGAMMADEX 400 MG: 100 INJECTION, SOLUTION INTRAVENOUS at 21:16

## 2023-01-01 RX ADMIN — MINERAL OIL, PETROLATUM 1 APPLICATION.: 425; 568 OINTMENT OPHTHALMIC at 00:28

## 2023-01-01 RX ADMIN — POTASSIUM CHLORIDE 20 MEQ: 14.9 INJECTION, SOLUTION INTRAVENOUS at 02:58

## 2023-01-01 RX ADMIN — HEPARIN SODIUM 5000 UNITS: 5000 INJECTION INTRAVENOUS; SUBCUTANEOUS at 21:07

## 2023-01-01 RX ADMIN — MINERAL OIL, PETROLATUM 1 APPLICATION.: 425; 568 OINTMENT OPHTHALMIC at 00:13

## 2023-01-01 RX ADMIN — SODIUM CHLORIDE, PRESERVATIVE FREE 10 ML: 5 INJECTION INTRAVENOUS at 08:23

## 2023-01-01 RX ADMIN — MINERAL OIL, PETROLATUM 1 APPLICATION.: 425; 568 OINTMENT OPHTHALMIC at 20:00

## 2023-01-01 RX ADMIN — SODIUM CHLORIDE, PRESERVATIVE FREE 10 ML: 5 INJECTION INTRAVENOUS at 20:57

## 2023-01-01 RX ADMIN — Medication 20 MG: at 02:03

## 2023-01-01 RX ADMIN — IOHEXOL 75 ML: 350 INJECTION, SOLUTION INTRAVENOUS at 17:16

## 2023-01-01 RX ADMIN — FAMOTIDINE 20 MG: 20 TABLET, FILM COATED ORAL at 08:23

## 2023-01-01 RX ADMIN — LEVETIRACETAM 500 MG: 100 SOLUTION ORAL at 20:06

## 2023-01-01 RX ADMIN — LABETALOL HYDROCHLORIDE 20 MG: 5 INJECTION, SOLUTION INTRAVENOUS at 05:33

## 2023-01-01 RX ADMIN — ACETAMINOPHEN 650 MG: 650 SOLUTION ORAL at 00:12

## 2023-01-01 RX ADMIN — MAGNESIUM HYDROXIDE 30 ML: 400 SUSPENSION ORAL at 18:35

## 2023-01-01 RX ADMIN — ACETAMINOPHEN 650 MG: 650 SOLUTION ORAL at 00:31

## 2023-01-01 RX ADMIN — MORPHINE SULFATE 4 MG: 2 INJECTION, SOLUTION INTRAMUSCULAR; INTRAVENOUS at 11:50

## 2023-01-01 RX ADMIN — SODIUM CHLORIDE, PRESERVATIVE FREE 10 ML: 5 INJECTION INTRAVENOUS at 08:13

## 2023-01-01 RX ADMIN — LORAZEPAM 1 MG: 2 INJECTION INTRAMUSCULAR; INTRAVENOUS at 08:45

## 2023-01-01 RX ADMIN — MINERAL OIL, PETROLATUM 1 APPLICATION.: 425; 568 OINTMENT OPHTHALMIC at 11:09

## 2023-01-01 RX ADMIN — HYDRALAZINE HYDROCHLORIDE 20 MG: 20 INJECTION INTRAMUSCULAR; INTRAVENOUS at 04:10

## 2023-01-01 RX ADMIN — LORAZEPAM 1 MG: 2 INJECTION INTRAMUSCULAR; INTRAVENOUS at 10:30

## 2023-01-01 RX ADMIN — LEVETIRACETAM 500 MG: 100 SOLUTION ORAL at 08:17

## 2023-01-01 RX ADMIN — ACETAMINOPHEN 650 MG: 650 SOLUTION ORAL at 04:12

## 2023-01-01 RX ADMIN — ENALAPRIL MALEATE 5 MG: 5 TABLET ORAL at 08:24

## 2023-01-01 RX ADMIN — FAMOTIDINE 20 MG: 20 TABLET, FILM COATED ORAL at 20:27

## 2023-01-01 RX ADMIN — SODIUM CHLORIDE, PRESERVATIVE FREE 10 ML: 5 INJECTION INTRAVENOUS at 21:15

## 2023-01-01 RX ADMIN — SODIUM CHLORIDE, PRESERVATIVE FREE 2 ML: 5 INJECTION INTRAVENOUS at 08:13

## 2023-01-01 RX ADMIN — Medication 125 MCG/HR: at 00:55

## 2023-01-01 RX ADMIN — GLYCOPYRROLATE 0.2 MG: 0.2 INJECTION INTRAMUSCULAR; INTRAVENOUS at 08:30

## 2023-01-01 RX ADMIN — Medication 100 MG: at 16:34

## 2023-01-01 RX ADMIN — MINERAL OIL, PETROLATUM 1 APPLICATION.: 425; 568 OINTMENT OPHTHALMIC at 07:49

## 2023-01-01 RX ADMIN — CHLORHEXIDINE GLUCONATE 15 ML: 1.2 RINSE ORAL at 20:27

## 2023-01-01 RX ADMIN — SODIUM CHLORIDE, PRESERVATIVE FREE 10 ML: 5 INJECTION INTRAVENOUS at 20:00

## 2023-01-01 RX ADMIN — Medication 20 MG: at 18:10

## 2023-01-01 RX ADMIN — MINERAL OIL, PETROLATUM 1 APPLICATION.: 425; 568 OINTMENT OPHTHALMIC at 11:27

## 2023-01-01 RX ADMIN — LEVETIRACETAM 500 MG: 100 SOLUTION ORAL at 20:56

## 2023-01-01 RX ADMIN — INSULIN LISPRO 3 UNITS: 100 INJECTION, SOLUTION INTRAVENOUS; SUBCUTANEOUS at 06:34

## 2023-01-01 RX ADMIN — MINERAL OIL, PETROLATUM 1 APPLICATION.: 425; 568 OINTMENT OPHTHALMIC at 16:40

## 2023-01-01 RX ADMIN — SODIUM CHLORIDE: 9 INJECTION, SOLUTION INTRAVENOUS at 00:00

## 2023-01-01 RX ADMIN — ACETAMINOPHEN 650 MG: 650 SOLUTION ORAL at 10:56

## 2023-01-01 RX ADMIN — SODIUM CHLORIDE: 9 INJECTION, SOLUTION INTRAVENOUS at 11:20

## 2023-01-01 RX ADMIN — MINERAL OIL, PETROLATUM 1 APPLICATION.: 425; 568 OINTMENT OPHTHALMIC at 13:33

## 2023-01-01 RX ADMIN — HYDRALAZINE HYDROCHLORIDE 20 MG: 20 INJECTION INTRAMUSCULAR; INTRAVENOUS at 20:10

## 2023-01-01 RX ADMIN — SODIUM PHOSPHATE, MONOBASIC, MONOHYDRATE AND SODIUM PHOSPHATE, DIBASIC, ANHYDROUS 45 MMOL: 142; 276 INJECTION, SOLUTION INTRAVENOUS at 21:38

## 2023-01-01 RX ADMIN — ACETAMINOPHEN 650 MG: 650 SOLUTION ORAL at 20:59

## 2023-01-01 RX ADMIN — CHLORHEXIDINE GLUCONATE 15 ML: 1.2 RINSE ORAL at 08:02

## 2023-01-01 RX ADMIN — LORAZEPAM 1 MG: 2 INJECTION INTRAMUSCULAR; INTRAVENOUS at 10:15

## 2023-01-01 RX ADMIN — ROCURONIUM BROMIDE 20 MG: 10 INJECTION, SOLUTION INTRAVENOUS at 19:35

## 2023-01-01 RX ADMIN — MORPHINE SULFATE 4 MG: 2 INJECTION, SOLUTION INTRAMUSCULAR; INTRAVENOUS at 08:30

## 2023-01-01 RX ADMIN — SODIUM CHLORIDE, PRESERVATIVE FREE 2 ML: 5 INJECTION INTRAVENOUS at 20:29

## 2023-01-01 RX ADMIN — SODIUM CHLORIDE, PRESERVATIVE FREE 10 ML: 5 INJECTION INTRAVENOUS at 08:17

## 2023-01-01 RX ADMIN — NICARDIPINE HYDROCHLORIDE 10 MG/HR: 0.2 INJECTION, SOLUTION INTRAVENOUS at 03:57

## 2023-01-01 RX ADMIN — MINERAL OIL, PETROLATUM 1 APPLICATION.: 425; 568 OINTMENT OPHTHALMIC at 04:13

## 2023-01-01 RX ADMIN — MANNITOL 25 G: 20 INJECTION, SOLUTION INTRAVENOUS at 19:09

## 2023-01-01 RX ADMIN — SENNOSIDES AND DOCUSATE SODIUM 2 TABLET: 50; 8.6 TABLET ORAL at 20:00

## 2023-01-01 RX ADMIN — CHLORHEXIDINE GLUCONATE 15 ML: 1.2 RINSE ORAL at 20:56

## 2023-01-01 RX ADMIN — INSULIN LISPRO 3 UNITS: 100 INJECTION, SOLUTION INTRAVENOUS; SUBCUTANEOUS at 06:21

## 2023-01-01 RX ADMIN — SODIUM CHLORIDE, PRESERVATIVE FREE 10 ML: 5 INJECTION INTRAVENOUS at 20:13

## 2023-01-01 RX ADMIN — ENOXAPARIN SODIUM 30 MG: 30 INJECTION SUBCUTANEOUS at 08:24

## 2023-01-01 RX ADMIN — MINERAL OIL, PETROLATUM 1 APPLICATION.: 425; 568 OINTMENT OPHTHALMIC at 16:48

## 2023-01-01 RX ADMIN — Medication 20 MG: at 09:41

## 2023-01-01 RX ADMIN — MINERAL OIL, PETROLATUM 1 APPLICATION.: 425; 568 OINTMENT OPHTHALMIC at 20:07

## 2023-01-01 RX ADMIN — SODIUM CHLORIDE, PRESERVATIVE FREE 2 ML: 5 INJECTION INTRAVENOUS at 21:15

## 2023-01-01 RX ADMIN — PROPOFOL INJECTABLE EMULSION 40 MCG/KG/MIN: 10 INJECTION, EMULSION INTRAVENOUS at 03:34

## 2023-01-01 RX ADMIN — SODIUM CHLORIDE, PRESERVATIVE FREE 10 ML: 5 INJECTION INTRAVENOUS at 08:05

## 2023-01-01 RX ADMIN — SENNOSIDES AND DOCUSATE SODIUM 2 TABLET: 50; 8.6 TABLET ORAL at 08:55

## 2023-01-01 RX ADMIN — Medication 100 MCG: at 19:25

## 2023-01-01 RX ADMIN — MINERAL OIL, PETROLATUM 1 APPLICATION.: 425; 568 OINTMENT OPHTHALMIC at 20:14

## 2023-01-01 RX ADMIN — MINERAL OIL, PETROLATUM 1 APPLICATION.: 425; 568 OINTMENT OPHTHALMIC at 00:11

## 2023-01-01 RX ADMIN — SODIUM CHLORIDE, PRESERVATIVE FREE 10 ML: 5 INJECTION INTRAVENOUS at 13:04

## 2023-01-01 RX ADMIN — SODIUM CHLORIDE: 9 INJECTION, SOLUTION INTRAVENOUS at 02:50

## 2023-01-01 RX ADMIN — SODIUM CHLORIDE, PRESERVATIVE FREE 10 ML: 5 INJECTION INTRAVENOUS at 08:12

## 2023-01-01 RX ADMIN — NICARDIPINE HYDROCHLORIDE 2.5 MG/HR: 0.2 INJECTION, SOLUTION INTRAVENOUS at 00:00

## 2023-01-01 RX ADMIN — SODIUM CHLORIDE, PRESERVATIVE FREE 10 ML: 5 INJECTION INTRAVENOUS at 08:56

## 2023-01-01 RX ADMIN — POTASSIUM & SODIUM PHOSPHATES POWDER PACK 280-160-250 MG 2 PACKET: 280-160-250 PACK at 08:27

## 2023-01-01 RX ADMIN — SODIUM CHLORIDE, PRESERVATIVE FREE 10 ML: 5 INJECTION INTRAVENOUS at 07:47

## 2023-01-01 RX ADMIN — ROCURONIUM BROMIDE 10 MG: 10 INJECTION, SOLUTION INTRAVENOUS at 19:52

## 2023-01-01 RX ADMIN — INSULIN LISPRO 3 UNITS: 100 INJECTION, SOLUTION INTRAVENOUS; SUBCUTANEOUS at 00:11

## 2023-01-01 RX ADMIN — SODIUM CHLORIDE, PRESERVATIVE FREE 10 ML: 5 INJECTION INTRAVENOUS at 08:02

## 2023-01-01 RX ADMIN — POTASSIUM & SODIUM PHOSPHATES POWDER PACK 280-160-250 MG 2 PACKET: 280-160-250 PACK at 13:34

## 2023-01-01 RX ADMIN — INSULIN LISPRO 3 UNITS: 100 INJECTION, SOLUTION INTRAVENOUS; SUBCUTANEOUS at 00:25

## 2023-01-01 RX ADMIN — MORPHINE SULFATE 4 MG: 2 INJECTION, SOLUTION INTRAMUSCULAR; INTRAVENOUS at 09:00

## 2023-01-01 RX ADMIN — MINERAL OIL, PETROLATUM 1 APPLICATION.: 425; 568 OINTMENT OPHTHALMIC at 08:55

## 2023-01-01 RX ADMIN — MINERAL OIL, PETROLATUM 1 APPLICATION.: 425; 568 OINTMENT OPHTHALMIC at 03:33

## 2023-01-01 RX ADMIN — POLYETHYLENE GLYCOL (3350) 17 G: 17 POWDER, FOR SOLUTION ORAL at 09:17

## 2023-01-01 RX ADMIN — LEVETIRACETAM 500 MG: 100 INJECTION, SOLUTION INTRAVENOUS at 23:15

## 2023-01-01 RX ADMIN — Medication 100 MCG/HR: at 02:37

## 2023-01-01 RX ADMIN — CEFAZOLIN 2000 ML: 1 INJECTION, POWDER, FOR SOLUTION INTRAMUSCULAR; INTRAVENOUS at 20:35

## 2023-01-01 RX ADMIN — NICARDIPINE HYDROCHLORIDE 5 MG/HR: 0.2 INJECTION, SOLUTION INTRAVENOUS at 07:03

## 2023-01-01 RX ADMIN — LEVETIRACETAM 500 MG: 100 INJECTION, SOLUTION INTRAVENOUS at 08:12

## 2023-01-01 RX ADMIN — HYDRALAZINE HYDROCHLORIDE 10 MG: 20 INJECTION INTRAMUSCULAR; INTRAVENOUS at 09:17

## 2023-01-01 RX ADMIN — SODIUM CHLORIDE 500 ML: 9 INJECTION, SOLUTION INTRAVENOUS at 23:15

## 2023-01-01 RX ADMIN — POTASSIUM & SODIUM PHOSPHATES POWDER PACK 280-160-250 MG 1 PACKET: 280-160-250 PACK at 05:52

## 2023-01-01 RX ADMIN — ENOXAPARIN SODIUM 30 MG: 30 INJECTION SUBCUTANEOUS at 21:16

## 2023-01-01 RX ADMIN — ENOXAPARIN SODIUM 30 MG: 30 INJECTION SUBCUTANEOUS at 20:56

## 2023-01-01 RX ADMIN — Medication 50 MCG/HR: at 17:33

## 2023-01-01 RX ADMIN — ACETAMINOPHEN 650 MG: 650 SOLUTION ORAL at 04:49

## 2023-01-01 RX ADMIN — POTASSIUM & SODIUM PHOSPHATES POWDER PACK 280-160-250 MG 1 PACKET: 280-160-250 PACK at 12:24

## 2023-01-01 RX ADMIN — ACETAMINOPHEN 650 MG: 650 SOLUTION ORAL at 12:31

## 2023-01-01 RX ADMIN — CHLORHEXIDINE GLUCONATE 15 ML: 1.2 RINSE ORAL at 08:22

## 2023-01-01 RX ADMIN — MINERAL OIL, PETROLATUM 1 APPLICATION.: 425; 568 OINTMENT OPHTHALMIC at 19:57

## 2023-01-01 RX ADMIN — LORAZEPAM 1 MG: 2 INJECTION INTRAMUSCULAR; INTRAVENOUS at 11:15

## 2023-01-01 RX ADMIN — HYDRALAZINE HYDROCHLORIDE 20 MG: 20 INJECTION INTRAMUSCULAR; INTRAVENOUS at 09:44

## 2023-01-01 RX ADMIN — INSULIN LISPRO 3 UNITS: 100 INJECTION, SOLUTION INTRAVENOUS; SUBCUTANEOUS at 17:56

## 2023-01-01 RX ADMIN — ENALAPRIL MALEATE 5 MG: 5 TABLET ORAL at 08:02

## 2023-01-01 RX ADMIN — SODIUM CHLORIDE, PRESERVATIVE FREE 2 ML: 5 INJECTION INTRAVENOUS at 23:53

## 2023-01-01 RX ADMIN — INSULIN LISPRO 3 UNITS: 100 INJECTION, SOLUTION INTRAVENOUS; SUBCUTANEOUS at 13:23

## 2023-01-01 RX ADMIN — MINERAL OIL, PETROLATUM 1 APPLICATION.: 425; 568 OINTMENT OPHTHALMIC at 03:11

## 2023-01-01 RX ADMIN — HEPARIN SODIUM 29000 UNITS: 1000 INJECTION, SOLUTION INTRAVENOUS; SUBCUTANEOUS at 08:28

## 2023-01-01 RX ADMIN — MANNITOL 25 G: 20 INJECTION, SOLUTION INTRAVENOUS at 11:54

## 2023-01-01 RX ADMIN — LORAZEPAM 1 MG: 2 INJECTION INTRAMUSCULAR; INTRAVENOUS at 11:50

## 2023-01-01 RX ADMIN — FENTANYL CITRATE 50 MCG: 50 INJECTION, SOLUTION INTRAMUSCULAR; INTRAVENOUS at 10:54

## 2023-01-01 RX ADMIN — MANNITOL 25 G: 20 INJECTION, SOLUTION INTRAVENOUS at 06:52

## 2023-01-01 RX ADMIN — LABETALOL HYDROCHLORIDE 20 MG: 5 INJECTION, SOLUTION INTRAVENOUS at 18:49

## 2023-01-01 RX ADMIN — SODIUM CHLORIDE: 9 INJECTION, SOLUTION INTRAVENOUS at 02:53

## 2023-01-01 RX ADMIN — VANCOMYCIN HYDROCHLORIDE 1500 MG: 10 INJECTION, POWDER, LYOPHILIZED, FOR SOLUTION INTRAVENOUS at 00:02

## 2023-01-01 RX ADMIN — MORPHINE SULFATE 4 MG: 2 INJECTION, SOLUTION INTRAMUSCULAR; INTRAVENOUS at 10:15

## 2023-01-01 RX ADMIN — MORPHINE SULFATE 4 MG: 2 INJECTION, SOLUTION INTRAMUSCULAR; INTRAVENOUS at 10:40

## 2023-01-01 RX ADMIN — MINERAL OIL, PETROLATUM 1 APPLICATION.: 425; 568 OINTMENT OPHTHALMIC at 16:02

## 2023-01-01 RX ADMIN — MINERAL OIL, PETROLATUM 1 APPLICATION.: 425; 568 OINTMENT OPHTHALMIC at 19:59

## 2023-01-01 RX ADMIN — MANNITOL 25 G: 20 INJECTION, SOLUTION INTRAVENOUS at 23:38

## 2023-01-01 RX ADMIN — NICARDIPINE HYDROCHLORIDE 5 MG/HR: 0.2 INJECTION, SOLUTION INTRAVENOUS at 17:27

## 2023-01-01 RX ADMIN — SENNOSIDES AND DOCUSATE SODIUM 2 TABLET: 50; 8.6 TABLET ORAL at 20:56

## 2023-01-01 RX ADMIN — CHLORHEXIDINE GLUCONATE 15 ML: 1.2 RINSE ORAL at 22:00

## 2023-01-01 RX ADMIN — FENTANYL CITRATE 100 MCG: 50 INJECTION, SOLUTION INTRAMUSCULAR; INTRAVENOUS at 18:55

## 2023-01-01 RX ADMIN — FAMOTIDINE 20 MG: 20 TABLET, FILM COATED ORAL at 08:25

## 2023-01-01 RX ADMIN — SODIUM CHLORIDE, PRESERVATIVE FREE 10 ML: 5 INJECTION INTRAVENOUS at 13:09

## 2023-01-01 RX ADMIN — SODIUM CHLORIDE: 9 INJECTION, SOLUTION INTRAVENOUS at 19:25

## 2023-01-01 RX ADMIN — MANNITOL 25 G: 20 INJECTION, SOLUTION INTRAVENOUS at 07:31

## 2023-01-01 RX ADMIN — INSULIN LISPRO 3 UNITS: 100 INJECTION, SOLUTION INTRAVENOUS; SUBCUTANEOUS at 09:17

## 2023-01-01 RX ADMIN — MINERAL OIL, PETROLATUM 1 APPLICATION.: 425; 568 OINTMENT OPHTHALMIC at 04:29

## 2023-01-01 RX ADMIN — LEVETIRACETAM 500 MG: 100 SOLUTION ORAL at 21:16

## 2023-01-01 RX ADMIN — LEVETIRACETAM 1000 MG: 100 INJECTION, SOLUTION INTRAVENOUS at 17:30

## 2023-01-01 RX ADMIN — LABETALOL HYDROCHLORIDE 20 MG: 5 INJECTION, SOLUTION INTRAVENOUS at 23:16

## 2023-01-01 RX ADMIN — MINERAL OIL, PETROLATUM 1 APPLICATION.: 425; 568 OINTMENT OPHTHALMIC at 15:57

## 2023-01-01 RX ADMIN — SODIUM CHLORIDE, PRESERVATIVE FREE 10 ML: 5 INJECTION INTRAVENOUS at 20:29

## 2023-01-01 RX ADMIN — Medication 100 MCG/HR: at 08:28

## 2023-01-01 RX ADMIN — PROPOFOL INJECTABLE EMULSION 40 MCG/KG/MIN: 10 INJECTION, EMULSION INTRAVENOUS at 22:12

## 2023-01-01 RX ADMIN — PHENYLEPHRINE HYDROCHLORIDE 100 MCG/MIN: 10 INJECTION INTRAVENOUS at 19:25

## 2023-01-01 RX ADMIN — SODIUM CHLORIDE, PRESERVATIVE FREE 2 ML: 5 INJECTION INTRAVENOUS at 08:11

## 2023-01-01 RX ADMIN — LEVETIRACETAM 500 MG: 100 SOLUTION ORAL at 08:55

## 2023-01-01 RX ADMIN — POTASSIUM & SODIUM PHOSPHATES POWDER PACK 280-160-250 MG 1 PACKET: 280-160-250 PACK at 08:17

## 2023-01-01 RX ADMIN — CHLORHEXIDINE GLUCONATE 15 ML: 1.2 RINSE ORAL at 20:00

## 2023-01-01 RX ADMIN — MINERAL OIL, PETROLATUM 1 APPLICATION.: 425; 568 OINTMENT OPHTHALMIC at 08:18

## 2023-01-01 RX ADMIN — LORAZEPAM 1 MG: 2 INJECTION INTRAMUSCULAR; INTRAVENOUS at 09:00

## 2023-01-01 RX ADMIN — INSULIN LISPRO 3 UNITS: 100 INJECTION, SOLUTION INTRAVENOUS; SUBCUTANEOUS at 18:35

## 2023-01-01 RX ADMIN — LORAZEPAM 1 MG: 2 INJECTION INTRAMUSCULAR; INTRAVENOUS at 09:30

## 2023-01-01 RX ADMIN — POLYETHYLENE GLYCOL (3350) 17 G: 17 POWDER, FOR SOLUTION ORAL at 08:55

## 2023-01-01 RX ADMIN — MINERAL OIL, PETROLATUM 1 APPLICATION.: 425; 568 OINTMENT OPHTHALMIC at 12:06

## 2023-01-01 RX ADMIN — MINERAL OIL, PETROLATUM 1 APPLICATION.: 425; 568 OINTMENT OPHTHALMIC at 20:57

## 2023-01-01 RX ADMIN — SENNOSIDES AND DOCUSATE SODIUM 2 TABLET: 50; 8.6 TABLET ORAL at 15:12

## 2023-01-01 RX ADMIN — FAMOTIDINE 20 MG: 20 TABLET, FILM COATED ORAL at 08:17

## 2023-01-01 RX ADMIN — PROPOFOL INJECTABLE EMULSION 15 MCG/KG/MIN: 10 INJECTION, EMULSION INTRAVENOUS at 16:41

## 2023-01-01 RX ADMIN — MORPHINE SULFATE 4 MG: 2 INJECTION, SOLUTION INTRAMUSCULAR; INTRAVENOUS at 09:45

## 2023-01-01 RX ADMIN — SODIUM CHLORIDE, PRESERVATIVE FREE 10 ML: 5 INJECTION INTRAVENOUS at 08:55

## 2023-01-01 RX ADMIN — HEPARIN SODIUM 5000 UNITS: 5000 INJECTION INTRAVENOUS; SUBCUTANEOUS at 07:06

## 2023-01-01 RX ADMIN — FAMOTIDINE 20 MG: 20 TABLET, FILM COATED ORAL at 08:55

## 2023-01-01 RX ADMIN — Medication 200 MCG/HR: at 11:25

## 2023-01-01 RX ADMIN — CHLORHEXIDINE GLUCONATE 15 ML: 1.2 RINSE ORAL at 07:44

## 2023-01-01 RX ADMIN — ENALAPRIL MALEATE 5 MG: 5 TABLET ORAL at 08:17

## 2023-01-01 RX ADMIN — ENALAPRIL MALEATE 5 MG: 5 TABLET ORAL at 20:00

## 2023-01-01 RX ADMIN — ENALAPRIL MALEATE 5 MG: 5 TABLET ORAL at 21:45

## 2023-01-01 RX ADMIN — FAMOTIDINE 20 MG: 10 INJECTION INTRAVENOUS at 09:06

## 2023-01-01 RX ADMIN — SODIUM CHLORIDE, PRESERVATIVE FREE 10 ML: 5 INJECTION INTRAVENOUS at 07:46

## 2023-01-01 RX ADMIN — SODIUM CHLORIDE, PRESERVATIVE FREE 10 ML: 5 INJECTION INTRAVENOUS at 09:16

## 2023-01-01 RX ADMIN — MINERAL OIL, PETROLATUM 1 APPLICATION.: 425; 568 OINTMENT OPHTHALMIC at 00:51

## 2023-01-01 RX ADMIN — Medication 50 MCG/HR: at 18:24

## 2023-01-01 RX ADMIN — LEVETIRACETAM 500 MG: 100 SOLUTION ORAL at 08:02

## 2023-01-01 RX ADMIN — POLYETHYLENE GLYCOL (3350) 17 G: 17 POWDER, FOR SOLUTION ORAL at 08:02

## 2023-01-01 RX ADMIN — MINERAL OIL, PETROLATUM 1 APPLICATION.: 425; 568 OINTMENT OPHTHALMIC at 15:12

## 2023-01-01 RX ADMIN — SODIUM CHLORIDE, PRESERVATIVE FREE 10 ML: 5 INJECTION INTRAVENOUS at 08:59

## 2023-01-01 RX ADMIN — LABETALOL HYDROCHLORIDE 20 MG: 5 INJECTION, SOLUTION INTRAVENOUS at 13:45

## 2023-01-01 RX ADMIN — MINERAL OIL, PETROLATUM 1 APPLICATION.: 425; 568 OINTMENT OPHTHALMIC at 04:10

## 2023-01-01 RX ADMIN — SENNOSIDES AND DOCUSATE SODIUM 2 TABLET: 50; 8.6 TABLET ORAL at 08:17

## 2023-01-01 RX ADMIN — MINERAL OIL, PETROLATUM 1 APPLICATION.: 425; 568 OINTMENT OPHTHALMIC at 12:23

## 2023-01-01 RX ADMIN — MINERAL OIL, PETROLATUM 1 APPLICATION.: 425; 568 OINTMENT OPHTHALMIC at 23:34

## 2023-01-01 RX ADMIN — INSULIN LISPRO 3 UNITS: 100 INJECTION, SOLUTION INTRAVENOUS; SUBCUTANEOUS at 18:46

## 2023-01-01 RX ADMIN — SODIUM CHLORIDE, PRESERVATIVE FREE 10 ML: 5 INJECTION INTRAVENOUS at 20:28

## 2023-01-01 RX ADMIN — LORAZEPAM 1 MG: 2 INJECTION INTRAMUSCULAR; INTRAVENOUS at 10:00

## 2023-01-01 RX ADMIN — LEVETIRACETAM 500 MG: 100 SOLUTION ORAL at 08:24

## 2023-01-01 RX ADMIN — GADOBUTROL 10 ML: 604.72 INJECTION INTRAVENOUS at 18:54

## 2023-01-01 RX ADMIN — ETOMIDATE 20 MG: 2 INJECTION INTRAVENOUS at 16:33

## 2023-01-01 RX ADMIN — ENOXAPARIN SODIUM 30 MG: 30 INJECTION SUBCUTANEOUS at 08:56

## 2023-01-01 RX ADMIN — ROCURONIUM BROMIDE 50 MG: 10 INJECTION, SOLUTION INTRAVENOUS at 18:35

## 2023-01-01 RX ADMIN — LEVETIRACETAM 500 MG: 100 INJECTION, SOLUTION INTRAVENOUS at 09:06

## 2023-01-01 RX ADMIN — FUROSEMIDE 40 MG: 10 INJECTION, SOLUTION INTRAMUSCULAR; INTRAVENOUS at 23:22

## 2023-01-01 RX ADMIN — PROPOFOL INJECTABLE EMULSION 40 MCG/KG/MIN: 10 INJECTION, EMULSION INTRAVENOUS at 07:57

## 2023-01-01 RX ADMIN — LORAZEPAM 1 MG: 2 INJECTION INTRAMUSCULAR; INTRAVENOUS at 09:15

## 2023-01-01 RX ADMIN — MORPHINE SULFATE 4 MG: 2 INJECTION, SOLUTION INTRAMUSCULAR; INTRAVENOUS at 10:00

## 2023-01-01 RX ADMIN — ENOXAPARIN SODIUM 30 MG: 30 INJECTION SUBCUTANEOUS at 20:07

## 2023-01-01 RX ADMIN — MINERAL OIL, PETROLATUM 1 APPLICATION.: 425; 568 OINTMENT OPHTHALMIC at 09:12

## 2023-01-01 RX ADMIN — SODIUM CHLORIDE: 9 INJECTION, SOLUTION INTRAVENOUS at 23:48

## 2023-01-01 RX ADMIN — FAMOTIDINE 20 MG: 20 TABLET, FILM COATED ORAL at 08:12

## 2023-01-01 RX ADMIN — LABETALOL HYDROCHLORIDE 20 MG: 5 INJECTION, SOLUTION INTRAVENOUS at 10:24

## 2023-01-01 RX ADMIN — CHLORHEXIDINE GLUCONATE 15 ML: 1.2 RINSE ORAL at 20:06

## 2023-01-01 RX ADMIN — LEVETIRACETAM 500 MG: 100 INJECTION, SOLUTION INTRAVENOUS at 21:15

## 2023-01-01 RX ADMIN — MANNITOL 25 G: 20 INJECTION, SOLUTION INTRAVENOUS at 12:06

## 2023-01-01 RX ADMIN — SODIUM CHLORIDE, PRESERVATIVE FREE 10 ML: 5 INJECTION INTRAVENOUS at 08:24

## 2023-01-01 RX ADMIN — HYDRALAZINE HYDROCHLORIDE 20 MG: 20 INJECTION INTRAMUSCULAR; INTRAVENOUS at 23:33

## 2023-01-01 RX ADMIN — PROPOFOL INJECTABLE EMULSION 20 MCG/KG/MIN: 10 INJECTION, EMULSION INTRAVENOUS at 21:01

## 2023-01-01 RX ADMIN — ENOXAPARIN SODIUM 30 MG: 30 INJECTION SUBCUTANEOUS at 22:00

## 2023-01-01 RX ADMIN — INSULIN LISPRO 3 UNITS: 100 INJECTION, SOLUTION INTRAVENOUS; SUBCUTANEOUS at 00:06

## 2023-01-01 RX ADMIN — PROPOFOL INJECTABLE EMULSION 25 MCG/KG/MIN: 10 INJECTION, EMULSION INTRAVENOUS at 04:48

## 2023-01-01 RX ADMIN — POTASSIUM CHLORIDE 40 MEQ: 1.5 POWDER, FOR SOLUTION ORAL at 08:17

## 2023-01-01 RX ADMIN — LORAZEPAM 1 MG: 2 INJECTION INTRAMUSCULAR; INTRAVENOUS at 09:45

## 2023-01-01 RX ADMIN — Medication 20 MG: at 04:42

## 2023-01-01 RX ADMIN — CHLORHEXIDINE GLUCONATE 15 ML: 1.2 RINSE ORAL at 20:13

## 2023-01-01 RX ADMIN — MINERAL OIL, PETROLATUM 1 APPLICATION.: 425; 568 OINTMENT OPHTHALMIC at 20:26

## 2023-01-01 RX ADMIN — MINERAL OIL, PETROLATUM 1 APPLICATION.: 425; 568 OINTMENT OPHTHALMIC at 08:13

## 2023-01-01 RX ADMIN — SENNOSIDES AND DOCUSATE SODIUM 2 TABLET: 50; 8.6 TABLET ORAL at 08:24

## 2023-01-01 RX ADMIN — MINERAL OIL, PETROLATUM 1 APPLICATION.: 425; 568 OINTMENT OPHTHALMIC at 11:51

## 2023-01-01 RX ADMIN — FAMOTIDINE 20 MG: 20 TABLET, FILM COATED ORAL at 22:00

## 2023-01-01 RX ADMIN — LEVETIRACETAM 500 MG: 100 SOLUTION ORAL at 08:22

## 2023-01-01 RX ADMIN — CHLORHEXIDINE GLUCONATE 15 ML: 1.2 RINSE ORAL at 08:12

## 2023-01-01 RX ADMIN — MINERAL OIL, PETROLATUM 1 APPLICATION.: 425; 568 OINTMENT OPHTHALMIC at 15:15

## 2023-01-01 RX ADMIN — MORPHINE SULFATE 4 MG: 2 INJECTION, SOLUTION INTRAMUSCULAR; INTRAVENOUS at 08:45

## 2023-01-01 RX ADMIN — ROCURONIUM BROMIDE 20 MG: 10 INJECTION, SOLUTION INTRAVENOUS at 20:11

## 2023-01-01 RX ADMIN — SODIUM CHLORIDE, PRESERVATIVE FREE 10 ML: 5 INJECTION INTRAVENOUS at 08:18

## 2023-01-01 RX ADMIN — SENNOSIDES AND DOCUSATE SODIUM 2 TABLET: 50; 8.6 TABLET ORAL at 20:27

## 2023-01-01 RX ADMIN — Medication 20 MG: at 17:12

## 2023-01-01 RX ADMIN — ASPIRIN 81 MG CHEWABLE TABLET 81 MG: 81 TABLET CHEWABLE at 06:28

## 2023-01-01 RX ADMIN — ENALAPRIL MALEATE 5 MG: 5 TABLET ORAL at 08:55

## 2023-01-01 RX ADMIN — LEVETIRACETAM 500 MG: 100 SOLUTION ORAL at 20:27

## 2023-01-01 RX ADMIN — FAMOTIDINE 20 MG: 20 TABLET, FILM COATED ORAL at 21:16

## 2023-01-01 RX ADMIN — CEFAZOLIN SODIUM 2000 MG: 300 INJECTION, POWDER, LYOPHILIZED, FOR SOLUTION INTRAVENOUS at 17:35

## 2023-01-01 RX ADMIN — LORAZEPAM 1 MG: 2 INJECTION INTRAMUSCULAR; INTRAVENOUS at 08:30

## 2023-01-01 RX ADMIN — MINERAL OIL, PETROLATUM 1 APPLICATION.: 425; 568 OINTMENT OPHTHALMIC at 12:31

## 2023-01-01 RX ADMIN — NICARDIPINE HYDROCHLORIDE 15 MG/HR: 0.2 INJECTION, SOLUTION INTRAVENOUS at 18:22

## 2023-01-01 RX ADMIN — INSULIN LISPRO 3 UNITS: 100 INJECTION, SOLUTION INTRAVENOUS; SUBCUTANEOUS at 11:22

## 2023-01-01 RX ADMIN — ENOXAPARIN SODIUM 30 MG: 30 INJECTION SUBCUTANEOUS at 20:00

## 2023-01-01 RX ADMIN — FAMOTIDINE 20 MG: 20 TABLET, FILM COATED ORAL at 20:07

## 2023-01-01 RX ADMIN — SODIUM CHLORIDE, PRESERVATIVE FREE 10 ML: 5 INJECTION INTRAVENOUS at 13:05

## 2023-01-01 RX ADMIN — POTASSIUM CHLORIDE 20 MEQ: 14.9 INJECTION, SOLUTION INTRAVENOUS at 00:06

## 2023-01-01 RX ADMIN — FAMOTIDINE 20 MG: 20 TABLET, FILM COATED ORAL at 20:00

## 2023-01-01 RX ADMIN — MINERAL OIL, PETROLATUM 1 APPLICATION.: 425; 568 OINTMENT OPHTHALMIC at 04:00

## 2023-01-01 RX ADMIN — PROPOFOL INJECTABLE EMULSION 30 MCG/KG/MIN: 10 INJECTION, EMULSION INTRAVENOUS at 01:31

## 2023-01-01 RX ADMIN — MINERAL OIL, PETROLATUM 1 APPLICATION.: 425; 568 OINTMENT OPHTHALMIC at 08:05

## 2023-01-01 RX ADMIN — LABETALOL HYDROCHLORIDE 20 MG: 5 INJECTION, SOLUTION INTRAVENOUS at 08:44

## 2023-01-01 RX ADMIN — FAMOTIDINE 20 MG: 20 TABLET, FILM COATED ORAL at 08:02

## 2023-01-01 RX ADMIN — MANNITOL 25 G: 20 INJECTION, SOLUTION INTRAVENOUS at 00:48

## 2023-01-01 RX ADMIN — SENNOSIDES AND DOCUSATE SODIUM 2 TABLET: 50; 8.6 TABLET ORAL at 20:06

## 2023-01-01 RX ADMIN — INSULIN LISPRO 3 UNITS: 100 INJECTION, SOLUTION INTRAVENOUS; SUBCUTANEOUS at 06:11

## 2023-01-01 RX ADMIN — CHLORHEXIDINE GLUCONATE 15 ML: 1.2 RINSE ORAL at 09:05

## 2023-01-01 RX ADMIN — SODIUM CHLORIDE, PRESERVATIVE FREE 10 ML: 5 INJECTION INTRAVENOUS at 08:04

## 2023-01-01 RX ADMIN — TETANUS TOXOID, REDUCED DIPHTHERIA TOXOID AND ACELLULAR PERTUSSIS VACCINE, ADSORBED 0.5 ML: 5; 2.5; 8; 8; 2.5 SUSPENSION INTRAMUSCULAR at 16:44

## 2023-01-01 RX ADMIN — Medication 20 MG: at 01:49

## 2023-01-01 RX ADMIN — SENNOSIDES AND DOCUSATE SODIUM 2 TABLET: 50; 8.6 TABLET ORAL at 08:23

## 2023-01-01 SDOH — ECONOMIC STABILITY: HOUSING INSECURITY: WHAT IS YOUR LIVING SITUATION TODAY?: HOUSE

## 2023-01-01 SDOH — ECONOMIC STABILITY: HOUSING INSECURITY: WHAT IS YOUR LIVING SITUATION TODAY?: SPOUSE

## 2023-01-01 SDOH — ECONOMIC STABILITY: HOUSING INSECURITY: ARE YOU WORRIED ABOUT LOSING YOUR HOUSING?: NO

## 2023-01-01 SDOH — SOCIAL STABILITY: SOCIAL NETWORK: SUPPORT SYSTEMS: CHILDREN;SPOUSE

## 2023-01-01 ASSESSMENT — PAIN SCALES - BEHAVIORAL PAIN SCALE (BPS)
BPS_SCORE: 3
BPS_SCORE: 4
BPS_SCORE: 3
BPS_SCORE: 4
BPS_SCORE: 3

## 2023-01-01 ASSESSMENT — LIFESTYLE VARIABLES
ALCOHOL_USE_STATUS: UNABLE TO SCREEN COGNITIVE IMPAIRMENT
AUDIT-C TOTAL SCORE: 7
HOW MANY STANDARD DRINKS CONTAINING ALCOHOL DO YOU HAVE ON A TYPICAL DAY: 3 OR 4
HOW OFTEN DO YOU HAVE 6 OR MORE DRINKS ON ONE OCCASION: MONTHLY
HOW OFTEN DO YOU HAVE A DRINK CONTAINING ALCOHOL: 4 OR MORE TIMES PER WEEK

## 2023-01-01 ASSESSMENT — ACTIVITIES OF DAILY LIVING (ADL)
ADL_BEFORE_ADMISSION: INDEPENDENT
RECENT_DECLINE_ADL: NO
ADL_SCORE: 12
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
## (undated) DEVICE — DRAPE 2 INCS FILM ANTIMICROBIAL 33X23IN SURG IOBAN STRL

## (undated) DEVICE — GLOVE SURG 7.5 PREMIERPRO LF NATURAL PF TXTR SMTH STRL

## (undated) DEVICE — SUTURE PRMHND SUTUPAK 3-0 30IN SILK BRAID TIES 10

## (undated) DEVICE — SPONGE GAUZE 6.75X6IN CTN ABS STRL LF BLK2

## (undated) DEVICE — PERFORATOR 14MM 11MM 3MM SHELF CRNL WHT

## (undated) DEVICE — BLADE SAW 32X6.4MM THK.79MM THK1.07MM STRNM STRL

## (undated) DEVICE — CLIP XTRN SCALP 10 CLIP 20 CRTDG STRL DISP

## (undated) DEVICE — CATHETER IV 16GA 1.88IN RADOPQ DEHP-FR FEP BD ANGIOCATH

## (undated) DEVICE — SUTURE PRMHND 2-0 30IN SILK BRAID TIES 10 STRN

## (undated) DEVICE — GLOVE SURG 7.5 PROTEXIS LF BLUE PF SMTH BEAD CUFF INTLK STRL

## (undated) DEVICE — GLOVE SURG 6.5 PROTEXIS LF BLUE PF SMTH BEAD CUFF INTLK STRL

## (undated) DEVICE — NEEDLE FLTR 18GA 1.5IN 5UM REG WALL BLUNT BVL LL HUB DEHP-FR

## (undated) DEVICE — GOWN SURG XL L4 RAGLAN SLV BRTHBL STRL LF DISP SMARTGOWN

## (undated) DEVICE — GLOVE SURG 7 PROTEXIS LF CRM PF BEAD CUFF STRL PLISPRN 12IN

## (undated) DEVICE — PAD DRSG 8X3IN CRD POLY CTN ABS PERFORATE FILM LF STRL

## (undated) DEVICE — GLOVE SURG 6 PROTEXIS LF CRM PF BEAD CUFF STRL PLISPRN 11.5

## (undated) DEVICE — SUTURE VICRYL 0 CT1 18IN CNTRL RELS BRAID 8 STRN COAT ABS J840D

## (undated) DEVICE — CLEANER ESURG TIP 1.75X1.75IN MDCHC PNCL STRL LF

## (undated) DEVICE — SPONGE SURG 3X1IN CTTND ABS PRCS CUT RADOPQ PATTIE STRL LF

## (undated) DEVICE — HEMOSTAT ABS 8X4IN FLXB SHEER WEAVE SURGICEL STRL DISP

## (undated) DEVICE — GLOVE SURG 7 PROTEXIS ESTM LF CRM PF SMTH BEAD CUFF INTLK

## (undated) DEVICE — DRAPE SLUSH WRMR RND BASIN 66X44IN EQUIPMENT STRL

## (undated) DEVICE — COVER RGD STRL LF LIGHT HNDL PLASTIC GRN

## (undated) DEVICE — GOWN SURG XL L3 NONREINFORCE SET IN SLV STRL LF DISP BLUE

## (undated) DEVICE — SYRINGE 10ML GRAD N-PYRG DEHP-FR PVC FREE STRL MED LF DISP

## (undated) DEVICE — DRESSING PETRO 8X1IN NADH OCL BCTRST LOWPRFL XEROFORM 3% BI

## (undated) DEVICE — SUTURE ETHILON 2 LR 30IN 2 ARM MONO NABSB BLK 490T

## (undated) DEVICE — KIT DRN 18IN 400CC 12.5IN RND 3 SPRG EVAC Y CNCT TUBE HOLE

## (undated) DEVICE — Device

## (undated) DEVICE — TUBING SCT 2FT 5MM FRZR MALE CNCT CLAMP STRL DISP

## (undated) DEVICE — SYRINGE 1ML SLIP TIP STRL TB LF DISP

## (undated) DEVICE — GOWN SURG XL L4 IMPRV REINFORCE SET IN SLV STRL LF DISP BLUE

## (undated) DEVICE — STAPLER SKIN 3.9X6.9MM WIDE RECT 35 CNT ROTATE HEAD RCHT

## (undated) DEVICE — SUTURE NUROLON 4-0 TF TAPERPOINT 18IN CNTRL RELS BRAID 8 C584D

## (undated) DEVICE — CLEANER ESURG TIP 5X5CM DEVON LG ADH BACK CAUT PLSHR RADOPQ

## (undated) DEVICE — HOOK RTRCT 12MM LONE STAR BLUNT ELASTIC STAY STRL LF DISP

## (undated) DEVICE — PENCIL SMKEVC COAT PSHBTN LF

## (undated) DEVICE — GLOVE SURG 7.5 PROTEXIS LF CRM PF SMTH BEAD CUFF STRL

## (undated) DEVICE — SOLUTION PREP 70% ISO ALC 4OZ LF

## (undated) DEVICE — BLADE SURG 11 UNFRM SHARPNESS STRL SS

## (undated) DEVICE — COLLAR MIAMI J SELECT SET UNIV

## (undated) DEVICE — TOWEL OR BLU 16X26IN 4PK

## (undated) DEVICE — SUTURE VICRYL 2-0 CP-2 18IN CNTRL RELS BRAID 8 STRN COAT ABS J762D

## (undated) DEVICE — SUTURE ETHILON MTPS 3-0 PS2 18IN MONO NABSB BLK 1669H

## (undated) DEVICE — GLOVE SURG 7.5 PROTEXIS ESTM LF CRM PF SMTH BEAD CUFF INTLK

## (undated) DEVICE — OINTMENT ANBTC CRD LF

## (undated) DEVICE — SUTURE VICRYL 3-0 SH 18IN CNTRL RELS BRAID 8 STRN COAT ABS J864D

## (undated) DEVICE — ELECTRODE PT RTN C30- LB 9FT CORD NONIRRITATE NONSENSITIZE

## (undated) DEVICE — KIT SURG BN FLAP LF GSAM

## (undated) DEVICE — TOOL 8CM MIDAS REX LGND 2.3MM SPRL TPR FT ATCH DSCT 15.9MM

## (undated) NOTE — LETTER
Patient Name: Zoraida Philip  YOB: 1965          MRN number:  [id number]  Date:  8/17/2018  Referring Physician: Anita Dutta MD      Progress Summary    Dear Dr. Nik Mathias,    Pt has attended 4, cancelled 0, and no shown 0 visits in P

## (undated) NOTE — MR AVS SNAPSHOT
[facility] [address] [place] [address]  [address] [facility] [address]  [phone]               Thank you for choosing us for your health care visit with Nitza Love NP. We are glad to serve you and happy to provide you with this summary of your visit. · Over-the-counter decongestants may be used unless a similar medicine was prescribed. Nasal sprays work the fastest. Use one that contains phenylephrine or oxymetazoline. First blow the nose gently. Then use the spray.  Do not use these medicines more ofte [address]116. All rights reserved. This information is not intended as a substitute for professional medical care. Always follow your healthcare professional's instructions.              Your Appointments     Jan 30, 2017  1:00 PM   FOLLOW UP with Rui Dunham Phone:  [phone]    - Doxycycline Hyclate 100 MG Tabs            MyChart     Visit [email or web address]  You can access your MyChart to more actively manage your health care and view more details from this visit by going to [email or web address].   If you'v

## (undated) NOTE — LETTER
Patient Name: Mono Rosenthal  YOB: 1965          MRN number:  [id number]  Date:  8/27/2018  Referring Physician:  Dr Mary Jane Adames MD      Progress Summary    Dear Dr. Ole Funez,    Pt has attended 6, cancelled 0, and no shown 0 visits Electronically signed by therapist: Francisco Vallecillo